# Patient Record
Sex: MALE | Race: OTHER | Employment: OTHER | URBAN - METROPOLITAN AREA
[De-identification: names, ages, dates, MRNs, and addresses within clinical notes are randomized per-mention and may not be internally consistent; named-entity substitution may affect disease eponyms.]

---

## 2017-07-25 RX ORDER — INSULIN LISPRO 100 [IU]/ML
INJECTION, SOLUTION INTRAVENOUS; SUBCUTANEOUS
COMMUNITY

## 2017-07-25 RX ORDER — LISINOPRIL 40 MG/1
40 TABLET ORAL DAILY
COMMUNITY

## 2017-07-25 RX ORDER — HYDROCODONE BITARTRATE AND ACETAMINOPHEN 7.5; 325 MG/1; MG/1
1 TABLET ORAL 3 TIMES DAILY
COMMUNITY
End: 2022-07-14

## 2017-07-25 RX ORDER — OFLOXACIN 3 MG/ML
2 SOLUTION/ DROPS OPHTHALMIC 3 TIMES DAILY
COMMUNITY
End: 2017-12-21

## 2017-07-25 RX ORDER — GABAPENTIN 300 MG/1
1200 CAPSULE ORAL 3 TIMES DAILY
COMMUNITY

## 2017-07-25 RX ORDER — ROSUVASTATIN CALCIUM 10 MG/1
10 TABLET, COATED ORAL DAILY
COMMUNITY
End: 2017-12-21

## 2017-07-25 RX ORDER — INSULIN ASPART 100 [IU]/ML
55 INJECTION, SUSPENSION SUBCUTANEOUS 2 TIMES DAILY
COMMUNITY
End: 2017-12-21

## 2017-07-25 RX ORDER — PREDNISONE 5 MG/1
TABLET ORAL SEE ADMIN INSTRUCTIONS
COMMUNITY
End: 2017-12-21

## 2017-07-25 RX ORDER — AMLODIPINE BESYLATE 10 MG/1
10 TABLET ORAL DAILY
COMMUNITY

## 2017-07-26 ENCOUNTER — ANESTHESIA EVENT (OUTPATIENT)
Dept: ENDOSCOPY | Age: 67
End: 2017-07-26
Payer: COMMERCIAL

## 2017-07-26 ENCOUNTER — ANESTHESIA (OUTPATIENT)
Dept: ENDOSCOPY | Age: 67
End: 2017-07-26
Payer: COMMERCIAL

## 2017-07-26 ENCOUNTER — HOSPITAL ENCOUNTER (OUTPATIENT)
Age: 67
Setting detail: OUTPATIENT SURGERY
Discharge: HOME | End: 2017-07-26
Attending: INTERNAL MEDICINE | Admitting: INTERNAL MEDICINE
Payer: COMMERCIAL

## 2017-07-26 VITALS
HEIGHT: 69 IN | RESPIRATION RATE: 19 BRPM | TEMPERATURE: 97.9 F | BODY MASS INDEX: 31.4 KG/M2 | SYSTOLIC BLOOD PRESSURE: 145 MMHG | OXYGEN SATURATION: 96 % | WEIGHT: 212 LBS | HEART RATE: 66 BPM | DIASTOLIC BLOOD PRESSURE: 85 MMHG

## 2017-07-26 LAB
GLUCOSE BLD STRIP.AUTO-MCNC: 186 MG/DL (ref 65–100)
SERVICE CMNT-IMP: ABNORMAL

## 2017-07-26 PROCEDURE — 74011250636 HC RX REV CODE- 250/636

## 2017-07-26 PROCEDURE — 88305 TISSUE EXAM BY PATHOLOGIST: CPT | Performed by: INTERNAL MEDICINE

## 2017-07-26 PROCEDURE — 74011250636 HC RX REV CODE- 250/636: Performed by: INTERNAL MEDICINE

## 2017-07-26 PROCEDURE — 82962 GLUCOSE BLOOD TEST: CPT

## 2017-07-26 PROCEDURE — 77030027957 HC TBNG IRR ENDOGTR BUSS -B: Performed by: INTERNAL MEDICINE

## 2017-07-26 PROCEDURE — 74011250637 HC RX REV CODE- 250/637: Performed by: INTERNAL MEDICINE

## 2017-07-26 PROCEDURE — 76040000007: Performed by: INTERNAL MEDICINE

## 2017-07-26 PROCEDURE — 77030029384 HC SNR POLYP CAPTVR II BSC -B: Performed by: INTERNAL MEDICINE

## 2017-07-26 PROCEDURE — 76060000032 HC ANESTHESIA 0.5 TO 1 HR: Performed by: INTERNAL MEDICINE

## 2017-07-26 PROCEDURE — 77030011640 HC PAD GRND REM COVD -A: Performed by: INTERNAL MEDICINE

## 2017-07-26 PROCEDURE — 77030003657 HC NDL SCLER BSC -B: Performed by: INTERNAL MEDICINE

## 2017-07-26 RX ORDER — EPINEPHRINE 0.1 MG/ML
1 INJECTION INTRACARDIAC; INTRAVENOUS
Status: DISCONTINUED | OUTPATIENT
Start: 2017-07-26 | End: 2017-07-26 | Stop reason: HOSPADM

## 2017-07-26 RX ORDER — ATROPINE SULFATE 0.1 MG/ML
0.5 INJECTION INTRAVENOUS
Status: DISCONTINUED | OUTPATIENT
Start: 2017-07-26 | End: 2017-07-26 | Stop reason: HOSPADM

## 2017-07-26 RX ORDER — SODIUM CHLORIDE 0.9 % (FLUSH) 0.9 %
5-10 SYRINGE (ML) INJECTION EVERY 8 HOURS
Status: DISCONTINUED | OUTPATIENT
Start: 2017-07-26 | End: 2017-07-26 | Stop reason: HOSPADM

## 2017-07-26 RX ORDER — DEXTROMETHORPHAN/PSEUDOEPHED 2.5-7.5/.8
1.2 DROPS ORAL
Status: DISCONTINUED | OUTPATIENT
Start: 2017-07-26 | End: 2017-07-26 | Stop reason: HOSPADM

## 2017-07-26 RX ORDER — FENTANYL CITRATE 50 UG/ML
100 INJECTION, SOLUTION INTRAMUSCULAR; INTRAVENOUS
Status: DISCONTINUED | OUTPATIENT
Start: 2017-07-26 | End: 2017-07-26 | Stop reason: HOSPADM

## 2017-07-26 RX ORDER — FLUMAZENIL 0.1 MG/ML
0.2 INJECTION INTRAVENOUS
Status: DISCONTINUED | OUTPATIENT
Start: 2017-07-26 | End: 2017-07-26 | Stop reason: HOSPADM

## 2017-07-26 RX ORDER — PROPOFOL 10 MG/ML
INJECTION, EMULSION INTRAVENOUS AS NEEDED
Status: DISCONTINUED | OUTPATIENT
Start: 2017-07-26 | End: 2017-07-26 | Stop reason: HOSPADM

## 2017-07-26 RX ORDER — SODIUM CHLORIDE 9 MG/ML
50 INJECTION, SOLUTION INTRAVENOUS CONTINUOUS
Status: DISCONTINUED | OUTPATIENT
Start: 2017-07-26 | End: 2017-07-26 | Stop reason: HOSPADM

## 2017-07-26 RX ORDER — MIDAZOLAM HYDROCHLORIDE 1 MG/ML
.25-1 INJECTION, SOLUTION INTRAMUSCULAR; INTRAVENOUS
Status: DISCONTINUED | OUTPATIENT
Start: 2017-07-26 | End: 2017-07-26 | Stop reason: HOSPADM

## 2017-07-26 RX ORDER — NALOXONE HYDROCHLORIDE 0.4 MG/ML
0.4 INJECTION, SOLUTION INTRAMUSCULAR; INTRAVENOUS; SUBCUTANEOUS
Status: DISCONTINUED | OUTPATIENT
Start: 2017-07-26 | End: 2017-07-26 | Stop reason: HOSPADM

## 2017-07-26 RX ORDER — SODIUM CHLORIDE 0.9 % (FLUSH) 0.9 %
5-10 SYRINGE (ML) INJECTION AS NEEDED
Status: DISCONTINUED | OUTPATIENT
Start: 2017-07-26 | End: 2017-07-26 | Stop reason: HOSPADM

## 2017-07-26 RX ORDER — SODIUM CHLORIDE 9 MG/ML
INJECTION, SOLUTION INTRAVENOUS
Status: DISCONTINUED | OUTPATIENT
Start: 2017-07-26 | End: 2017-07-26 | Stop reason: HOSPADM

## 2017-07-26 RX ADMIN — PROPOFOL 50 MG: 10 INJECTION, EMULSION INTRAVENOUS at 10:51

## 2017-07-26 RX ADMIN — PROPOFOL 50 MG: 10 INJECTION, EMULSION INTRAVENOUS at 10:41

## 2017-07-26 RX ADMIN — PROPOFOL 20 MG: 10 INJECTION, EMULSION INTRAVENOUS at 10:38

## 2017-07-26 RX ADMIN — PROPOFOL 50 MG: 10 INJECTION, EMULSION INTRAVENOUS at 10:48

## 2017-07-26 RX ADMIN — SODIUM CHLORIDE: 9 INJECTION, SOLUTION INTRAVENOUS at 10:07

## 2017-07-26 RX ADMIN — PROPOFOL 50 MG: 10 INJECTION, EMULSION INTRAVENOUS at 10:34

## 2017-07-26 RX ADMIN — SIMETHICONE 80 MG: 20 SUSPENSION/ DROPS ORAL at 10:31

## 2017-07-26 RX ADMIN — PROPOFOL 30 MG: 10 INJECTION, EMULSION INTRAVENOUS at 10:30

## 2017-07-26 RX ADMIN — PROPOFOL 30 MG: 10 INJECTION, EMULSION INTRAVENOUS at 10:27

## 2017-07-26 RX ADMIN — PROPOFOL 20 MG: 10 INJECTION, EMULSION INTRAVENOUS at 10:25

## 2017-07-26 RX ADMIN — SODIUM CHLORIDE 50 ML/HR: 900 INJECTION, SOLUTION INTRAVENOUS at 10:12

## 2017-07-26 RX ADMIN — PROPOFOL 50 MG: 10 INJECTION, EMULSION INTRAVENOUS at 10:21

## 2017-07-26 RX ADMIN — PROPOFOL 50 MG: 10 INJECTION, EMULSION INTRAVENOUS at 10:45

## 2017-07-26 NOTE — ROUTINE PROCESS
Saundra Baker  1950  [id number]    Situation:  Verbal report received from: Jocelin Akers RN  Procedure: Procedure(s):  COLONOSCOPY  ENDOSCOPIC MUCOSAL RESECTION  ENDOSCOPIC POLYPECTOMY  INJECTION    Background:    Preoperative diagnosis: 2CM ADENOMA IN LEFT COLON  Postoperative diagnosis: 1.- Surgical Anastamosis  2.- Diverticulosis  3.- Transverse Colon Tatoos x 2   4.- Transverse Colon Peacemeal Polypectomy  5.- Inadequate Preparation    :  Dr. Petros Morales  Assistant(s): Endoscopy Technician-1: Carmen Zuniga  Endoscopy RN-1: Ellie Dee RN    Specimens:   ID Type Source Tests Collected by Time Destination   1 : Transverse Colon Polyp Ventura Turner MD 7/26/2017 1039 Pathology     H. Pylori  no    Assessment:  Intra-procedure medications   Anesthesia gave intra-procedure sedation and medications, see anesthesia flow sheet yes    Intravenous fluids: NS@ KVO     Vital signs stable     Abdominal assessment: round and soft     Recommendation:  Discharge patient per MD order.   Family   Permission to share finding with family or friend yes

## 2017-07-26 NOTE — H&P
[address] [facility] [facility] [address]  217 [facility] 140 [facility], [address]  [phone]                                History and Physical     NAME: Ke Hsu   :  1950   MRN:  [id number]     HPI:  The patient was seen and examined. Date of last colonoscopy: 4 months, Polyps  Yes   Large polyp not removed at that time    Past Surgical History:   Procedure Laterality Date    HX CHOLECYSTECTOMY      HX GI      6 inches colon removed - d/t blockage    HX GI      colonoscopy - 2017 - polyps    HX ORTHOPAEDIC      surgery for left wrist fx after it healed d/t pt not being able to move it - has hardware     Past Medical History:   Diagnosis Date    Arthritis     Chronic obstructive pulmonary disease (HCC)     Chronic pain     neck/back/legs/knees especially right    Diabetes ([address]     type 1    Hypertension     Ill-defined condition     increased cholesterol    Ill-defined condition     peripheral neuropathy    Ill-defined condition     DDD in back and neck - sciatic pain - hx endoscopic injections in back     Social History   Substance Use Topics    Smoking status: Current Every Day Smoker    Smokeless tobacco: Never Used    Alcohol use Yes      Comment: 2-3 drinks per night     No Known Allergies  Family History   Problem Relation Age of Onset    Heart Disease Mother     Other Mother      DDD/arthritis    Stroke Mother      x3    Heart Disease Father     Other Father      arthritis/colon resection d/t \"being clogged\"/kidney stone    Cancer Paternal Uncle      lung     Current Facility-Administered Medications   Medication Dose Route Frequency    0.9% sodium chloride infusion  50 mL/hr IntraVENous CONTINUOUS    sodium chloride (NS) flush 5-10 mL  5-10 mL IntraVENous Q8H    sodium chloride (NS) flush 5-10 mL  5-10 mL IntraVENous PRN    midazolam (VERSED) injection 0.25-10 mg  0.25-10 mg IntraVENous Multiple    fentaNYL citrate (PF) injection 100 mcg  100 mcg IntraVENous MULTIPLE DOSE GIVEN    naloxone (NARCAN) injection 0.4 mg  0.4 mg IntraVENous Multiple    flumazenil (ROMAZICON) 0.1 mg/mL injection 0.2 mg  0.2 mg IntraVENous Multiple    simethicone (MYLICON) 53WK/6.5NF oral drops 80 mg  1.2 mL Oral Multiple    atropine injection 0.5 mg  0.5 mg IntraVENous ONCE PRN    EPINEPHrine (ADRENALIN) 0.1 mg/mL syringe 1 mg  1 mg Endoscopically ONCE PRN     Facility-Administered Medications Ordered in Other Encounters   Medication Dose Route Frequency    0.9% sodium chloride infusion   IntraVENous CONTINUOUS         PHYSICAL EXAM:  General: WD, WN. Alert, cooperative, no acute distress    HEENT: NC, Atraumatic. PERRLA, EOMI. Anicteric sclerae. Lungs:  CTA Bilaterally. No Wheezing/Rhonchi/Rales. Heart:  Regular  rhythm,  No murmur, No Rubs, No Gallops  Abdomen: Soft, Non distended, Non tender.  +Bowel sounds, no HSM  Extremities: No c/c/e  Neurologic:  CN 2-12 gi, Alert and oriented X 3. No acute neurological distress   Psych:   Good insight. Not anxious nor agitated. The heart, lungs and mental status were satisfactory for the administration of MAC sedation and for the procedure.       Mallampati score: 3       Assessment:   · Large colon polyp for removal    Plan:   · Endoscopic procedure  · MAC sedation   ·   ·

## 2017-07-26 NOTE — IP AVS SNAPSHOT
[address] 
[phone] Patient: Lucila Mathur MRN: [id number] DQB:5/0/8397 You are allergic to the following No active allergies Recent Documentation Height Weight BMI Smoking Status 1.753 m 96.2 kg 31.31 kg/m2 Current Every Day Smoker Emergency Contacts Name Discharge Info Relation Home Work Mobile Sharlene Gutierrez DISCHARGE CAREGIVER [3] Spouse [3] [phone] About your hospitalization You were admitted on:  July 26, 2017 You last received care in the:  [facility] ENDOSCOPY You were discharged on:  July 26, 2017 Unit phone number:  [phone] Why you were hospitalized Your primary diagnosis was:  Not on File Providers Seen During Your Hospitalizations Provider Role Specialty Primary office phone Geraldine Melo MD Attending Provider Gastroenterology [phone] Your Primary Care Physician (PCP) Primary Care Physician Office Phone Office Fax Joy Eber [phone] Follow-up Information None Current Discharge Medication List  
  
CONTINUE these medications which have NOT CHANGED Dose & Instructions Dispensing Information Comments Morning Noon Evening Bedtime  
 amLODIPine 10 mg tablet Commonly known as:  Remonia Grates Your last dose was: Your next dose is:    
   
   
 Dose:  10 mg Take 10 mg by mouth daily. Amlodipine besylate Refills:  0  
     
   
   
   
  
 CRESTOR 10 mg tablet Generic drug:  rosuvastatin Your last dose was: Your next dose is:    
   
   
 Dose:  10 mg Take 10 mg by mouth daily. Refills:  0  
     
   
   
   
  
 gabapentin 300 mg capsule Commonly known as:  NEURONTIN Your last dose was: Your next dose is:    
   
   
 Dose:  900 mg Take 900 mg by mouth three (3) times daily. Refills:  0 HumaLOG KwikPen 100 unit/mL kwikpen Generic drug:  insulin lispro Your last dose was: Your next dose is:    
   
   
 by SubCUTAneous route. Sliding scale in am as needed for elevated BS or if takes depends on diet he will eat. Refills:  0 HYDROcodone-acetaminophen 7.5-325 mg per tablet Commonly known as:  Zach Dhillon Your last dose was: Your next dose is:    
   
   
 Dose:  1 Tab Take 1 Tab by mouth three (3) times daily. Refills:  0  
     
   
   
   
  
 lisinopril 40 mg tablet Commonly known as:  Jose Ramachandran Your last dose was: Your next dose is:    
   
   
 Dose:  40 mg Take 40 mg by mouth daily. Refills:  0 NovoLOG Mix 70-30 FlexPen 100 unit/mL (70-30) Inpn Generic drug:  insulin aspart protamine/insulin aspart Your last dose was: Your next dose is:    
   
   
 Dose:  55 Units 55 Units by SubCUTAneous route two (2) times a day. Refills:  0  
     
   
   
   
  
 ofloxacin 0.3 % ophthalmic solution Commonly known as:  FLOXIN Your last dose was: Your next dose is:    
   
   
 Dose:  2 Drop Administer 2 Drops to left eye three (3) times daily. Refills:  0 POTASSIUM CHLORIDE PO Your last dose was: Your next dose is:    
   
   
 Dose:  10 mEq Take 10 mEq by mouth daily. ER Refills:  0  
     
   
   
   
  
 predniSONE 5 mg dose pack Commonly known as:  STERAPRED Your last dose was: Your next dose is: Take  by mouth See Admin Instructions. See administration instruction per 5mg dose pack. 7/25/2017 - 10 mg; 7/26/2017 - 5 mg; 7/272017 - 5 mg then will be finished (when medication was entered this was the doses left). Refills:  0 PROAIR HFA IN Your last dose was: Your next dose is:    
   
   
 Dose:  2 Puff Take 2 Puffs by inhalation as needed. Refills:  0 Discharge Instructions [address] 
[address] [place], [address] 
[phone] Elda Gracia [id number] 
1950 COLON DISCHARGE INSTRUCTIONS DISCOMFORT: 
Redness at IV site- apply warm compress to area; if redness or soreness persist- contact your physician There may be a slight amount of blood passed from the rectum Gaseous discomfort- walking, belching will help relieve any discomfort You may not operate a vehicle for 12 hours You may not  engage in an occupation involving machinery or appliances for rest of today You may not  drink alcoholic beverages for at least 12 hours Avoid making any critical decisions for at least 24 hour DIET: 
 High fiber diet.  however -  remember your colon is empty and a heavy meal will produce gas. Avoid these foods:  vegetables, fried / greasy foods, carbonated drinks for today ACTIVITY: It is recommended that you spend the remainder of the day resting -  avoid any strenuous activity. CALL M.D. ANY SIGN OF: Increasing pain, nausea, vomiting Abdominal distension (swelling) New increased bleeding (oral or rectal) Fever (chills) Pain in chest area Bloody discharge from nose or mouth Shortness of breath You may not  take any Advil, Aspirin, Ibuprofen, Motrin, Aleve, or Goodys for 7 days, ONLY  Tylenol as needed for pain. Post procedure diagnosis:  1.- Surgical Anastamosis 2.- Diverticulosis 3.- Transverse Colon Tatoos x 2  
4.- Transverse Colon Peacemeal Polypectomy 5.- Inadequate Preparation Follow-up Instructions: 
 
Call Dr. Yasmeen Greene for any questions or problems. If we took a biopsy please call the office within 2 weeks to discuss your                             pathology results. Telephone # [phone] Discharge Orders None Introducing [facility]! Angela Mckinnon introduces [employer] patient portal. Now you can access parts of your medical record, email your doctor's office, and request medication refills online. 1. In your internet browser, go to [email or web address] 2. Click on the First Time User? Click Here link in the Sign In box. You will see the New Member Sign Up page. 3. Enter your [employer] Access Code exactly as it appears below. You will not need to use this code after youve completed the sign-up process. If you do not sign up before the expiration date, you must request a new code. · [employer] Access Code: [email or web address] Expires: 10/24/2017 11:08 AM 
 
4. Enter the last four digits of your Social Security Number (xxxx) and Date of Birth (mm/dd/yyyy) as indicated and click Submit. You will be taken to the next sign-up page. 5. Create a [employer] ID. This will be your [employer] login ID and cannot be changed, so think of one that is secure and easy to remember. 6. Create a [employer] password. You can change your password at any time. 7. Enter your Password Reset Question and Answer. This can be used at a later time if you forget your password. 8. Enter your e-mail address. You will receive e-mail notification when new information is available in [address] 9. Click Sign Up. You can now view and download portions of your medical record. 10. Click the Download Summary menu link to download a portable copy of your medical information. If you have questions, please visit the Frequently Asked Questions section of the [employer] website. Remember, [employer] is NOT to be used for urgent needs. For medical emergencies, dial 911. Now available from your iPhone and Android! General Information Please provide this summary of care documentation to your next provider. Patient Signature:  ____________________________________________________________ Date:  ____________________________________________________________  
  
Arna Charlie Provider Signature:  ____________________________________________________________ Date:  ____________________________________________________________

## 2017-07-26 NOTE — PROGRESS NOTES

## 2017-07-26 NOTE — ANESTHESIA PREPROCEDURE EVALUATION
Anesthetic History   No history of anesthetic complications            Review of Systems / Medical History  Patient summary reviewed, nursing notes reviewed and pertinent labs reviewed    Pulmonary  Within defined limits  COPD      Smoker         Neuro/Psych   Within defined limits           Cardiovascular  Within defined limits  Hypertension                   GI/Hepatic/Renal  Within defined limits              Endo/Other  Within defined limits  Diabetes: using insulin         Other Findings              Physical Exam    Airway  Mallampati: II  TM Distance: > 6 cm  Neck ROM: normal range of motion   Mouth opening: Normal     Cardiovascular  Regular rate and rhythm,  S1 and S2 normal,  no murmur, click, rub, or gallop             Dental    Dentition: Full upper dentures     Pulmonary  Breath sounds clear to auscultation               Abdominal  GI exam deferred       Other Findings            Anesthetic Plan    ASA: 3  Anesthesia type: MAC          Induction: Intravenous  Anesthetic plan and risks discussed with: Patient

## 2017-07-26 NOTE — ANESTHESIA POSTPROCEDURE EVALUATION
Post-Anesthesia Evaluation and Assessment    Patient: Shar Patterson MRN: [id number]  SSN: [id number]    YOB: 1950  Age: 74 y.o. Sex: male       Cardiovascular Function/Vital Signs  Visit Vitals    BP (!) 88/40    Pulse 73    Temp 36.9 °C (98.4 °F)    Resp 16    Ht 5' 9\" (1.753 m)    Wt 96.2 kg (212 lb)    SpO2 95%    BMI 31.31 kg/m2       Patient is status post MAC anesthesia for Procedure(s):  COLONOSCOPY  ENDOSCOPIC MUCOSAL RESECTION  ENDOSCOPIC POLYPECTOMY  INJECTION. Nausea/Vomiting: None    Postoperative hydration reviewed and adequate. Pain:  Pain Scale 1: Numeric (0 - 10) (07/26/17 0945)  Pain Intensity 1: 0 (07/26/17 0945)   Managed    Neurological Status: At baseline    Mental Status and Level of Consciousness: Arousable    Pulmonary Status:   O2 Device: Nasal cannula (07/26/17 1058)   Adequate oxygenation and airway patent    Complications related to anesthesia: None    Post-anesthesia assessment completed.  No concerns    Signed By: Deborah Barron MD     July 26, 2017

## 2017-07-26 NOTE — DISCHARGE INSTRUCTIONS
[address] [facility]  [address] [facility] [address], [address] [facility]                                  Ashlie   [id number]  1950    COLON DISCHARGE INSTRUCTIONS    DISCOMFORT:  Redness at IV site- apply warm compress to area; if redness or soreness persist- contact your physician  There may be a slight amount of blood passed from the rectum  Gaseous discomfort- walking, belching will help relieve any discomfort  You may not operate a vehicle for 12 hours  You may not  engage in an occupation involving machinery or appliances for rest of today  You may not  drink alcoholic beverages for at least 12 hours  Avoid making any critical decisions for at least 24 hour    DIET:   High fiber diet. - however -  remember your colon is empty and a heavy meal will produce gas. Avoid these foods:  vegetables, fried / greasy foods, carbonated drinks for today     ACTIVITY:  It is recommended that you spend the remainder of the day resting -  avoid any strenuous activity. CALL M.D. ANY SIGN OF:   Increasing pain, nausea, vomiting  Abdominal distension (swelling)  New increased bleeding (oral or rectal)  Fever (chills)  Pain in chest area  Bloody discharge from nose or mouth  Shortness of breath    You may not  take any Advil, Aspirin, Ibuprofen, Motrin, Aleve, or Goodys for 7 days, ONLY  Tylenol as needed for pain. Post procedure diagnosis:  1.- Surgical Anastamosis  2.- Diverticulosis  3.- Transverse Colon Tatoos x 2   4.- Transverse Colon Peacemeal Polypectomy  5.- Inadequate Preparation    Follow-up Instructions:    Call Dr. Callahan Person for any questions or problems. If we took a biopsy please call the office within 2 weeks to discuss your                             pathology results.  Telephone # [phone]

## 2017-07-26 NOTE — PROCEDURES
[address] [facility]  [address] [facility], [address]  [phone]                              Colonoscopy Procedure Note      Indications:    Personal history of colon polyps (screening only)     :  Meredith Mesa MD    Referring Provider: Jen Camara MD    Sedation:  MAC anesthesia    Procedure Details:  After informed consent was obtained with all risks and benefits of procedure explained and preoperative exam completed, the patient was taken to the endoscopy suite and placed in the left lateral decubitus position. Upon sequential sedation as per above, a digital rectal exam was performed  And was normal.  The Olympus videocolonoscope  was inserted in the rectum and carefully advanced to the cecum, which was identified by the ileocecal valve and appendiceal orifice. The quality of preparation was inadequate. The colonoscope was slowly withdrawn with careful evaluation between folds. Retroflexion in the rectum was performed and was normal..     Findings:   Rectum: small internal hemorrhoids were seen. Sigmoid:     - Diverticulosis  Endo to side colo-colonic anastomosis was seen in the distal sigmoid colon. Suture material was present. Anastomosis was healthy  Descending Colon: no mucosal lesion appreciated  Transverse Colon: One sessile polyp about 40 mm X 18 mm was seen in the distal transverse colon. This was not bleeding and occupied about 1/3 of the circumference over one mucosal fold. Two areas of tattoo were seen, one proximal and one distal to this lesion. Ascending Colon: no mucosal lesion appreciated  Cecum: no mucosal lesion appreciated  Terminal Ileum: not intubated    Interventions:  injection of 12 cc of Eleview upon retroflexion as well as direct view was successful in lifting the lesion well. 1 piece meal polypectomy were performed using hot snare in retroflexed as well as direct views.  The polyp fragments were  retrieved    Specimen Removed:    ID Type Source Tests Collected by Time Destination   1 : Transverse Colon Polyp Preservative   Joseph Ojeda MD 7/26/2017 1039 Pathology       Complications: None. EBL:  None. Recommendations:   -Await pathology.  -High fiber diet.  -Colonoscopy in 6 months  -NO aspirin/NSAID's for 7 days     Discharge Disposition:  Home in the company of a  when able to ambulate.     Joseph Ojeda MD  7/26/2017  11:00 AM

## 2017-12-21 RX ORDER — INSULIN LISPRO 100 [IU]/ML
55 INJECTION, SUSPENSION SUBCUTANEOUS 2 TIMES DAILY
COMMUNITY

## 2017-12-21 RX ORDER — DICLOFENAC SODIUM 50 MG/1
50 TABLET, DELAYED RELEASE ORAL 2 TIMES DAILY
COMMUNITY

## 2017-12-21 RX ORDER — OMEPRAZOLE 20 MG/1
20 CAPSULE, DELAYED RELEASE ORAL DAILY
COMMUNITY

## 2017-12-21 RX ORDER — ROSUVASTATIN CALCIUM 10 MG/1
20 TABLET, COATED ORAL DAILY
COMMUNITY

## 2017-12-21 RX ORDER — DULOXETIN HYDROCHLORIDE 30 MG/1
90 CAPSULE, DELAYED RELEASE ORAL DAILY
COMMUNITY

## 2017-12-22 ENCOUNTER — ANESTHESIA EVENT (OUTPATIENT)
Dept: ENDOSCOPY | Age: 67
End: 2017-12-22
Payer: COMMERCIAL

## 2017-12-22 ENCOUNTER — ANESTHESIA (OUTPATIENT)
Dept: ENDOSCOPY | Age: 67
End: 2017-12-22
Payer: COMMERCIAL

## 2017-12-22 ENCOUNTER — HOSPITAL ENCOUNTER (OUTPATIENT)
Age: 67
Setting detail: OUTPATIENT SURGERY
Discharge: HOME | End: 2017-12-22
Attending: INTERNAL MEDICINE | Admitting: INTERNAL MEDICINE
Payer: COMMERCIAL

## 2017-12-22 VITALS
RESPIRATION RATE: 22 BRPM | HEART RATE: 70 BPM | TEMPERATURE: 98.1 F | SYSTOLIC BLOOD PRESSURE: 139 MMHG | BODY MASS INDEX: 29.83 KG/M2 | WEIGHT: 202 LBS | OXYGEN SATURATION: 93 % | DIASTOLIC BLOOD PRESSURE: 58 MMHG

## 2017-12-22 LAB
GLUCOSE BLD STRIP.AUTO-MCNC: 282 MG/DL (ref 65–100)
SERVICE CMNT-IMP: ABNORMAL

## 2017-12-22 PROCEDURE — 76040000019: Performed by: INTERNAL MEDICINE

## 2017-12-22 PROCEDURE — 74011000250 HC RX REV CODE- 250

## 2017-12-22 PROCEDURE — 82962 GLUCOSE BLOOD TEST: CPT

## 2017-12-22 PROCEDURE — 74011250636 HC RX REV CODE- 250/636: Performed by: INTERNAL MEDICINE

## 2017-12-22 PROCEDURE — 74011250636 HC RX REV CODE- 250/636

## 2017-12-22 PROCEDURE — 76060000031 HC ANESTHESIA FIRST 0.5 HR: Performed by: INTERNAL MEDICINE

## 2017-12-22 PROCEDURE — 77030027957 HC TBNG IRR ENDOGTR BUSS -B: Performed by: INTERNAL MEDICINE

## 2017-12-22 RX ORDER — EPINEPHRINE 0.1 MG/ML
1 INJECTION INTRACARDIAC; INTRAVENOUS
Status: DISCONTINUED | OUTPATIENT
Start: 2017-12-22 | End: 2017-12-22 | Stop reason: HOSPADM

## 2017-12-22 RX ORDER — SODIUM CHLORIDE 9 MG/ML
50 INJECTION, SOLUTION INTRAVENOUS CONTINUOUS
Status: DISCONTINUED | OUTPATIENT
Start: 2017-12-22 | End: 2017-12-22 | Stop reason: HOSPADM

## 2017-12-22 RX ORDER — FENTANYL CITRATE 50 UG/ML
100 INJECTION, SOLUTION INTRAMUSCULAR; INTRAVENOUS
Status: DISCONTINUED | OUTPATIENT
Start: 2017-12-22 | End: 2017-12-22 | Stop reason: HOSPADM

## 2017-12-22 RX ORDER — DEXTROMETHORPHAN/PSEUDOEPHED 2.5-7.5/.8
1.2 DROPS ORAL
Status: DISCONTINUED | OUTPATIENT
Start: 2017-12-22 | End: 2017-12-22 | Stop reason: HOSPADM

## 2017-12-22 RX ORDER — FLUMAZENIL 0.1 MG/ML
0.2 INJECTION INTRAVENOUS
Status: DISCONTINUED | OUTPATIENT
Start: 2017-12-22 | End: 2017-12-22 | Stop reason: HOSPADM

## 2017-12-22 RX ORDER — MIDAZOLAM HYDROCHLORIDE 1 MG/ML
.25-1 INJECTION, SOLUTION INTRAMUSCULAR; INTRAVENOUS
Status: DISCONTINUED | OUTPATIENT
Start: 2017-12-22 | End: 2017-12-22 | Stop reason: HOSPADM

## 2017-12-22 RX ORDER — SODIUM CHLORIDE 0.9 % (FLUSH) 0.9 %
5-10 SYRINGE (ML) INJECTION EVERY 8 HOURS
Status: DISCONTINUED | OUTPATIENT
Start: 2017-12-22 | End: 2017-12-22 | Stop reason: HOSPADM

## 2017-12-22 RX ORDER — LIDOCAINE HYDROCHLORIDE 20 MG/ML
INJECTION, SOLUTION EPIDURAL; INFILTRATION; INTRACAUDAL; PERINEURAL AS NEEDED
Status: DISCONTINUED | OUTPATIENT
Start: 2017-12-22 | End: 2017-12-22 | Stop reason: HOSPADM

## 2017-12-22 RX ORDER — SODIUM CHLORIDE 9 MG/ML
INJECTION, SOLUTION INTRAVENOUS
Status: DISCONTINUED | OUTPATIENT
Start: 2017-12-22 | End: 2017-12-22 | Stop reason: HOSPADM

## 2017-12-22 RX ORDER — ATROPINE SULFATE 0.1 MG/ML
0.5 INJECTION INTRAVENOUS
Status: DISCONTINUED | OUTPATIENT
Start: 2017-12-22 | End: 2017-12-22 | Stop reason: HOSPADM

## 2017-12-22 RX ORDER — NALOXONE HYDROCHLORIDE 0.4 MG/ML
0.4 INJECTION, SOLUTION INTRAMUSCULAR; INTRAVENOUS; SUBCUTANEOUS
Status: DISCONTINUED | OUTPATIENT
Start: 2017-12-22 | End: 2017-12-22 | Stop reason: HOSPADM

## 2017-12-22 RX ORDER — SODIUM CHLORIDE 0.9 % (FLUSH) 0.9 %
5-10 SYRINGE (ML) INJECTION AS NEEDED
Status: DISCONTINUED | OUTPATIENT
Start: 2017-12-22 | End: 2017-12-22 | Stop reason: HOSPADM

## 2017-12-22 RX ORDER — PROPOFOL 10 MG/ML
INJECTION, EMULSION INTRAVENOUS AS NEEDED
Status: DISCONTINUED | OUTPATIENT
Start: 2017-12-22 | End: 2017-12-22 | Stop reason: HOSPADM

## 2017-12-22 RX ADMIN — PROPOFOL 50 MG: 10 INJECTION, EMULSION INTRAVENOUS at 12:48

## 2017-12-22 RX ADMIN — PROPOFOL 50 MG: 10 INJECTION, EMULSION INTRAVENOUS at 12:44

## 2017-12-22 RX ADMIN — PROPOFOL 50 MG: 10 INJECTION, EMULSION INTRAVENOUS at 12:51

## 2017-12-22 RX ADMIN — LIDOCAINE HYDROCHLORIDE 40 MG: 20 INJECTION, SOLUTION EPIDURAL; INFILTRATION; INTRACAUDAL; PERINEURAL at 12:44

## 2017-12-22 RX ADMIN — PROPOFOL 50 MG: 10 INJECTION, EMULSION INTRAVENOUS at 12:43

## 2017-12-22 RX ADMIN — SODIUM CHLORIDE: 9 INJECTION, SOLUTION INTRAVENOUS at 12:38

## 2017-12-22 RX ADMIN — PROPOFOL 50 MG: 10 INJECTION, EMULSION INTRAVENOUS at 12:46

## 2017-12-22 NOTE — PROCEDURES
[address] [facility].  217 [facility] [address], [address]  [phone]                              Colonoscopy Procedure Note      Indications:    Large colon polyp-s/p removal     :  Corinne Smith MD    Referring Provider: Mani Dupont MD    Sedation:  MAC anesthesia    Procedure Details:  After informed consent was obtained with all risks and benefits of procedure explained and preoperative exam completed, the patient was taken to the endoscopy suite and placed in the left lateral decubitus position. Upon sequential sedation as per above, a digital rectal exam was performed  And was normal.  The Olympus videocolonoscope  was inserted in the rectum and carefully advanced to the cecum, which was identified by the ileocecal valve and appendiceal orifice. The quality of preparation was fair. The colonoscope was slowly withdrawn with careful evaluation between folds. Retroflexion in the rectum was performed and was normal..     Findings:   Rectum: small internal hemorrhoids were seen. Sigmoid:     - Diverticulosis  Endo to side colo-colonic anastomosis was seen in the distal sigmoid colon. Suture material was present. Anastomosis was healthy  Descending Colon: no mucosal lesion appreciated  Transverse Colon: Two areas of tattoo were seen. The area of previously resected polyp was clean and without any residual polyp. Ascending Colon: no mucosal lesion appreciated  Cecum: no mucosal lesion appreciated  Terminal Ileum: not intubated    Interventions:  none    Specimen Removed:  * No specimens in log *    Complications: None. EBL:  None. Recommendations:   -High fiber diet. -Repeat colonoscopy in 1 year     Resume normal medication(s). Discharge Disposition:  Home in the company of a  when able to ambulate.     Corinne Smith MD  12/22/2017  12:56 PM

## 2017-12-22 NOTE — ANESTHESIA PREPROCEDURE EVALUATION
Anesthetic History   No history of anesthetic complications            Review of Systems / Medical History  Patient summary reviewed, nursing notes reviewed and pertinent labs reviewed    Pulmonary    COPD               Neuro/Psych   Within defined limits           Cardiovascular    Hypertension              Exercise tolerance: >4 METS     GI/Hepatic/Renal               Comments: polyps Endo/Other    Diabetes: using insulin  Hypothyroidism  Arthritis     Other Findings            Physical Exam    Airway  Mallampati: II  TM Distance: > 6 cm  Neck ROM: normal range of motion   Mouth opening: Normal     Cardiovascular    Rhythm: regular  Rate: normal         Dental  No notable dental hx       Pulmonary  Breath sounds clear to auscultation               Abdominal         Other Findings            Anesthetic Plan    ASA: 3  Anesthesia type: MAC          Induction: Intravenous  Anesthetic plan and risks discussed with: Patient

## 2017-12-22 NOTE — ROUTINE PROCESS
Brittney Barrientos  1950  [id number]    Situation:  Verbal report received from: Ramon Jean RN  Procedure: Procedure(s):  COLONOSCOPY    Background:    Preoperative diagnosis: PRECANCEROUS POYLPS  Postoperative diagnosis: Previous polyp site, tatooed, clear; diverticulosis, hemorrhoids    :  Dr. Rafia Moore  Assistant(s): Endoscopy Technician-1: Pushpa Ramos  Endoscopy RN-1: Mounika Murdock RN    Specimens: * No specimens in log *  H. Pylori  no    Assessment:  Intra-procedure medications       Anesthesia gave intra-procedure sedation and medications, see anesthesia flow sheet yes    Intravenous fluids: NS@ KVO     Vital signs stable     Abdominal assessment: round and soft     Recommendation:  Discharge patient per MD order  Family or Friend Pt wife  Permission to share finding with family or friend yes

## 2017-12-22 NOTE — PERIOP NOTES
Patient has been evaluated by anesthesia pre-procedure. Patient alert and oriented. Vital signs will not be charted by the Endoscopy nurse. All vitals, airway, and loc are monitored by anesthesia staff throughout procedure. .Endoscope was pre-cleaned at bedside immediately following procedure by VIOLET Patrick

## 2017-12-22 NOTE — ANESTHESIA POSTPROCEDURE EVALUATION
Post-Anesthesia Evaluation and Assessment    Patient: Awilda Bennett MRN: [id number]  SSN: [id number]    YOB: 1950  Age: 74 y.o. Sex: male       Cardiovascular Function/Vital Signs  Visit Vitals    /77    Pulse 79    Temp 36.7 °C (98.1 °F)    Resp 24    Wt 91.6 kg (202 lb)    SpO2 96%    BMI 29.83 kg/m2       Patient is status post MAC anesthesia for Procedure(s):  COLONOSCOPY. Nausea/Vomiting: None    Postoperative hydration reviewed and adequate. Pain:  Pain Scale 1: Numeric (0 - 10) (12/22/17 1303)  Pain Intensity 1: 0 (12/22/17 1303)   Managed    Neurological Status: At baseline    Mental Status and Level of Consciousness: Arousable    Pulmonary Status:   O2 Device: Room air (12/22/17 1303)   Adequate oxygenation and airway patent    Complications related to anesthesia: None    Post-anesthesia assessment completed.  No concerns    Signed By: Vern Johnson MD     December 22, 2017

## 2017-12-22 NOTE — DISCHARGE INSTRUCTIONS
Brianna Rae 272  217 [facility] [address], [address] [facility]                                  Russ Loomis  [id number]  1950    COLON DISCHARGE INSTRUCTIONS    DISCOMFORT:  Redness at IV site- apply warm compress to area; if redness or soreness persist- contact your physician  There may be a slight amount of blood passed from the rectum  Gaseous discomfort- walking, belching will help relieve any discomfort  You may not operate a vehicle for 12 hours  You may not  engage in an occupation involving machinery or appliances for rest of today  You may not  drink alcoholic beverages for at least 12 hours  Avoid making any critical decisions for at least 24 hour    DIET:   High fiber diet. - however -  remember your colon is empty and a heavy meal will produce gas. Avoid these foods:  vegetables, fried / greasy foods, carbonated drinks for today     ACTIVITY:  It is recommended that you spend the remainder of the day resting -  avoid any strenuous activity. CALL M.D. ANY SIGN OF:   Increasing pain, nausea, vomiting  Abdominal distension (swelling)  New increased bleeding (oral or rectal)  Fever (chills)  Pain in chest area  Bloody discharge from nose or mouth  Shortness of breath    You may not  take any Advil, Aspirin, Ibuprofen, Motrin, Aleve, or Goodys for 5 days, ONLY  Tylenol as needed for pain. Post procedure diagnosis:  Previous polyp site, tatooed, clear; diverticulosis, hemorrhoids    Follow-up Instructions:    Call Dr. Elizabeth Jones for any questions or problems. If we took a biopsy please call the office within 2 weeks to discuss your                             pathology results.  Telephone # [phone]

## 2017-12-22 NOTE — IP AVS SNAPSHOT
[address] 
[phone] Patient: Veronika Panchal MRN: [id number] LNB:[id number][birth year]8/[id number] About your hospitalization You were admitted on:  December 22, 2017 You last received care in the:  [facility] ENDOSCOPY You were discharged on:  December 22, 2017 Why you were hospitalized Your primary diagnosis was:  Not on File Discharge Orders None A check kam indicates which time of day the medication should be taken. My Medications TAKE these medications as instructed Instructions Each Dose to Equal  
 Morning Noon Evening Bedtime  
 amLODIPine 10 mg tablet Commonly known as:  Yee Spruce Your last dose was: Your next dose is: Take 10 mg by mouth daily. Amlodipine besylate 10 mg  
    
   
   
   
  
 BC HEADACHE POWDER PO Your last dose was: Your next dose is: Take  by mouth as needed. CRESTOR 10 mg tablet Generic drug:  rosuvastatin Your last dose was: Your next dose is: Take 20 mg by mouth daily. 20 mg  
    
   
   
   
  
 diclofenac EC 50 mg EC tablet Commonly known as:  VOLTAREN Your last dose was: Your next dose is: Take 50 mg by mouth two (2) times a day. 50 mg DULoxetine 30 mg capsule Commonly known as:  CYMBALTA Your last dose was: Your next dose is: Take 90 mg by mouth daily. 90 mg  
    
   
   
   
  
 gabapentin 300 mg capsule Commonly known as:  NEURONTIN Your last dose was: Your next dose is: Take 1,200 mg by mouth three (3) times daily. 1200 mg HumaLOG KwikPen 100 unit/mL kwikpen Generic drug:  insulin lispro Your last dose was: Your next dose is: by SubCUTAneous route. Sliding scale in am as needed for elevated BS or if takes depending on diet he will eat. HumaLOG Mix 75-25 KwikPen 100 unit/mL (75-25) Inpn Generic drug:  Insulin Lisp & Lisp Prot (Hum) Your last dose was: Your next dose is:    
   
   
 55 Units by SubCUTAneous route two (2) times a day. 55 Units HYDROcodone-acetaminophen 7.5-325 mg per tablet Commonly known as:  Fiorella Sanchez Your last dose was: Your next dose is: Take 1 Tab by mouth three (3) times daily. 1 Tab  
    
   
   
   
  
 lisinopril 40 mg tablet Commonly known as:  Fadumo Sykese Your last dose was: Your next dose is: Take 40 mg by mouth daily. 40 mg  
    
   
   
   
  
 omeprazole 20 mg capsule Commonly known as:  PRILOSEC Your last dose was: Your next dose is: Take 20 mg by mouth daily. 20 mg POTASSIUM CHLORIDE PO Your last dose was: Your next dose is: Take 10 mEq by mouth daily. ER  
 10 mEq PROAIR HFA IN Your last dose was: Your next dose is: Take 2 Puffs by inhalation as needed. 90mcg 2 Puff Discharge Instructions [address] 
217 [facility] [address] [facility], [address] 
[phone] Rosey Chand [id number] 
1950 COLON DISCHARGE INSTRUCTIONS DISCOMFORT: 
Redness at IV site- apply warm compress to area; if redness or soreness persist- contact your physician There may be a slight amount of blood passed from the rectum Gaseous discomfort- walking, belching will help relieve any discomfort You may not operate a vehicle for 12 hours You may not  engage in an occupation involving machinery or appliances for rest of today You may not  drink alcoholic beverages for at least 12 hours Avoid making any critical decisions for at least 24 hour DIET: 
 High fiber diet.  however -  remember your colon is empty and a heavy meal will produce gas. Avoid these foods:  vegetables, fried / greasy foods, carbonated drinks for today ACTIVITY: It is recommended that you spend the remainder of the day resting -  avoid any strenuous activity. CALL M.D. ANY SIGN OF: Increasing pain, nausea, vomiting Abdominal distension (swelling) New increased bleeding (oral or rectal) Fever (chills) Pain in chest area Bloody discharge from nose or mouth Shortness of breath You may not  take any Advil, Aspirin, Ibuprofen, Motrin, Aleve, or Goodys for 5 days, ONLY  Tylenol as needed for pain. Post procedure diagnosis:  Previous polyp site, tatooed, clear; diverticulosis, hemorrhoids Follow-up Instructions: 
 
Call Dr. Marlene Quarles for any questions or problems. If we took a biopsy please call the office within 2 weeks to discuss your                             pathology results. Telephone # [phone] Introducing [facility] & HEALTH SERVICES! Stefanie Nichols introduces [facility] [facility] [employer] patient portal. Now you can access parts of your medical record, email your doctor's office, and request medication refills online. 1. In your internet browser, go to [email or web address] 2. Click on the First Time User? Click Here link in the Sign In box. You will see the New Member Sign Up page. 3. Enter your [facility] [facility] [employer] Access Code exactly as it appears below. You will not need to use this code after youve completed the sign-up process. If you do not sign up before the expiration date, you must request a new code. · [facility] [facility] [employer] Access Code: [email or web address] Expires: 3/22/2018  1:33 PM 
 
4.  Enter the last four digits of your Social Security Number (xxxx) and Date of Birth (mm/dd/yyyy) as indicated and click Submit. You will be taken to the next sign-up page. 5. Create a CS-Keys ID. This will be your [employer] login ID and cannot be changed, so think of one that is secure and easy to remember. 6. Create a CS-[employer] password. You can change your password at any time. 7. Enter your Password Reset Question and Answer. This can be used at a later time if you forget your password. 8. Enter your e-mail address. You will receive e-mail notification when new information is available in [address] 9. Click Sign Up. You can now view and download portions of your medical record. 10. Click the Download Summary menu link to download a portable copy of your medical information. If you have questions, please visit the Frequently Asked Questions section of the CS-[employer] website. Remember, [employer] is NOT to be used for urgent needs. For medical emergencies, dial 911. Now available from your iPhone and Android! Providers Seen During Your Hospitalization Provider Specialty Primary office phone Flower Lim MD Gastroenterology [phone] Your Primary Care Physician (PCP) Primary Care Physician Office Phone Office Fax Tray Kaiser [phone] You are allergic to the following No active allergies Recent Documentation Weight BMI Smoking Status 91.6 kg 29.83 kg/m2 Current Every Day Smoker Emergency Contacts Name Discharge Info Relation Home Work Mobile Sucristian Khushboo DISCHARGE CAREGIVER [3] Spouse [3] [phone] Patient Belongings The following personal items are in your possession at time of discharge: 
                             
 
  
  
 Please provide this summary of care documentation to your next provider. Signatures-by signing, you are acknowledging that this After Visit Summary has been reviewed with you and you have received a copy. Patient Signature:  ____________________________________________________________ Date:  ____________________________________________________________  
  
Joelene Batman Provider Signature:  ____________________________________________________________ Date:  ____________________________________________________________

## 2017-12-22 NOTE — H&P
118 [facility].  217 [facility] 140 Chris Schaffer, [address]  [phone]                                History and Physical     NAME: Leopold Goring   :  1950   MRN:  [id number]     HPI:  The patient was seen and examined.     Past Surgical History:   Procedure Laterality Date    COLONOSCOPY N/A 2017    COLONOS[facility] by [facility]id Johnston [facility]    HX CHOLECYSTECTOMY      HX GI      6 inches colon removed - d/t blockage    HX GI      colonoscopy - 2017 - polyps    HX ORTHOPAEDIC      surgery for left wrist fx after it healed d/t pt not being able to move it - has hardware     Past Medical History:   Diagnosis Date    Adverse effect of anesthesia     kept pt in ICU longer after colon surgery d/t smoking    Arthritis     psoriatic    Chronic obstructive pulmonary disease (HCC)     Chronic pain     neck/back/legs/knees especially right/ankles/right hand/fingers    Diabetes ([facility] [address]     type 1    Hypertension     Ill-defined condition     increased cholesterol    Ill-defined condition     peripheral neuropathy    Ill-defined condition     DDD in back and neck - sciatic pain - hx endoscopic injections in back    Ill-defined condition     slight rotator cuff tears on both shoulders/injection in left shoulder    Rheumatic fever     Thyroid disease     \"a little bit high\"/improving when subsequent blood tests - monitoring     Social History   Substance Use Topics    Smoking status: Current Every Day Smoker    Smokeless tobacco: Never Used    Alcohol use No      Comment: none in 3 months now     No Known Allergies  Family History   Problem Relation Age of Onset    Heart Disease Mother     Other Mother      DDD/arthritis    Stroke Mother      x3    Heart Disease Father     Other Father      arthritis/colon resection d/t \"being clogged\"/kidney stone    Cancer Paternal Uncle      lung    Other Maternal Aunt      shingles    Heart Disease Maternal Aunt     Heart Disease Maternal Uncle     Heart Disease Maternal Uncle      Current Facility-Administered Medications   Medication Dose Route Frequency    0.9% sodium chloride infusion  50 mL/hr IntraVENous CONTINUOUS    sodium chloride (NS) flush 5-10 mL  5-10 mL IntraVENous Q8H    sodium chloride (NS) flush 5-10 mL  5-10 mL IntraVENous PRN    midazolam (VERSED) injection 0.25-10 mg  0.25-10 mg IntraVENous Multiple    fentaNYL citrate (PF) injection 100 mcg  100 mcg IntraVENous MULTIPLE DOSE GIVEN    naloxone (NARCAN) injection 0.4 mg  0.4 mg IntraVENous Multiple    flumazenil (ROMAZICON) 0.1 mg/mL injection 0.2 mg  0.2 mg IntraVENous Multiple    simethicone (MYLICON) 26GU/2.3RH oral drops 80 mg  1.2 mL Oral Multiple    atropine injection 0.5 mg  0.5 mg IntraVENous ONCE PRN    EPINEPHrine (ADRENALIN) 0.1 mg/mL syringe 1 mg  1 mg Endoscopically ONCE PRN         PHYSICAL EXAM:  General: WD, WN. Alert, cooperative, no acute distress    HEENT: NC, Atraumatic. PERRLA, EOMI. Anicteric sclerae. Lungs:  CTA Bilaterally. No Wheezing/Rhonchi/Rales. Heart:  Regular  rhythm,  No murmur, No Rubs, No Gallops  Abdomen: Soft, Non distended, Non tender.  +Bowel sounds, no HSM  Extremities: No c/c/e  Neurologic:  CN 2-12 gi, Alert and oriented X 3. No acute neurological distress   Psych:   Good insight. Not anxious nor agitated. The heart, lungs and mental status were satisfactory for the administration of MAC sedation and for the procedure.       Mallampati score: 3       Assessment:   · Large colon polyp-s/p piecemeal resection    Plan:   · Endoscopic procedure  · MAC sedation   ·

## 2021-07-02 ENCOUNTER — TRANSCRIBE ORDER (OUTPATIENT)
Dept: SCHEDULING | Age: 71
End: 2021-07-02

## 2021-07-02 DIAGNOSIS — I83.90 ASYMPTOMATIC VARICOSE VEINS: ICD-10-CM

## 2021-07-02 DIAGNOSIS — R60.0 EDEMA, LEG: Primary | ICD-10-CM

## 2021-07-02 DIAGNOSIS — F17.290 CIGAR SMOKER: ICD-10-CM

## 2021-07-09 ENCOUNTER — HOSPITAL ENCOUNTER (OUTPATIENT)
Dept: VASCULAR SURGERY | Age: 71
Discharge: HOME | End: 2021-07-09
Attending: PHYSICIAN ASSISTANT
Payer: COMMERCIAL

## 2021-07-09 DIAGNOSIS — F17.290 CIGAR SMOKER: ICD-10-CM

## 2021-07-09 DIAGNOSIS — R60.0 EDEMA, LEG: ICD-10-CM

## 2021-07-09 DIAGNOSIS — I83.90 ASYMPTOMATIC VARICOSE VEINS: ICD-10-CM

## 2021-07-09 PROCEDURE — 93971 EXTREMITY STUDY: CPT

## 2022-05-18 ENCOUNTER — TRANSCRIBE ORDER (OUTPATIENT)
Dept: SCHEDULING | Age: 72
End: 2022-05-18

## 2022-05-18 DIAGNOSIS — Z72.0 TOBACCO ABUSE: Primary | ICD-10-CM

## 2022-05-18 DIAGNOSIS — I10 HTN (HYPERTENSION): ICD-10-CM

## 2022-05-26 ENCOUNTER — TRANSCRIBE ORDER (OUTPATIENT)
Dept: SCHEDULING | Age: 72
End: 2022-05-26

## 2022-05-26 DIAGNOSIS — F17.290 NICOTINE DEPENDENCE, OTHER TOBACCO PRODUCT, UNCOMPLICATED: Primary | ICD-10-CM

## 2022-06-04 ENCOUNTER — HOSPITAL ENCOUNTER (OUTPATIENT)
Dept: CT IMAGING | Age: 72
Discharge: HOME | End: 2022-06-04
Attending: PHYSICIAN ASSISTANT
Payer: COMMERCIAL

## 2022-06-04 ENCOUNTER — HOSPITAL ENCOUNTER (OUTPATIENT)
Dept: ULTRASOUND IMAGING | Age: 72
Discharge: HOME | End: 2022-06-04
Attending: PHYSICIAN ASSISTANT
Payer: COMMERCIAL

## 2022-06-04 DIAGNOSIS — F17.290 NICOTINE DEPENDENCE, OTHER TOBACCO PRODUCT, UNCOMPLICATED: ICD-10-CM

## 2022-06-04 DIAGNOSIS — I10 HTN (HYPERTENSION): ICD-10-CM

## 2022-06-04 PROCEDURE — 76706 US ABDL AORTA SCREEN AAA: CPT

## 2022-06-04 PROCEDURE — 71271 CT THORAX LUNG CANCER SCR C-: CPT

## 2022-06-07 ENCOUNTER — TRANSCRIBE ORDER (OUTPATIENT)
Dept: SCHEDULING | Age: 72
End: 2022-06-07

## 2022-06-07 DIAGNOSIS — E04.9 ENLARGEMENT OF THYROID: Primary | ICD-10-CM

## 2022-07-06 ENCOUNTER — OFFICE VISIT (OUTPATIENT)
Dept: SLEEP MEDICINE | Age: 72
End: 2022-07-06
Payer: COMMERCIAL

## 2022-07-06 ENCOUNTER — DOCUMENTATION ONLY (OUTPATIENT)
Dept: SLEEP MEDICINE | Age: 72
End: 2022-07-06

## 2022-07-06 VITALS
OXYGEN SATURATION: 90 % | BODY MASS INDEX: 30.17 KG/M2 | SYSTOLIC BLOOD PRESSURE: 148 MMHG | HEIGHT: 69 IN | HEART RATE: 82 BPM | DIASTOLIC BLOOD PRESSURE: 94 MMHG | WEIGHT: 203.7 LBS | RESPIRATION RATE: 20 BRPM

## 2022-07-06 DIAGNOSIS — G47.33 OSA (OBSTRUCTIVE SLEEP APNEA): Primary | ICD-10-CM

## 2022-07-06 PROCEDURE — 99204 OFFICE O/P NEW MOD 45 MIN: CPT | Performed by: SPECIALIST

## 2022-07-06 PROCEDURE — 1123F ACP DISCUSS/DSCN MKR DOCD: CPT | Performed by: SPECIALIST

## 2022-07-06 RX ORDER — MELATONIN
DAILY
COMMUNITY

## 2022-07-06 NOTE — PROGRESS NOTES
[address] [place], [address]  Tel.  [phone]  Fax. 100 [facility] 60  [place], [address] [facility]  Tel.  [phone]  Fax. [phone] [address] [facility] [place] [address]  Tel.  [phone]  Fax. [phone]       Chief Complaint       Chief Complaint   Patient presents with    New Patient       HPI      Lee Sender is 81 y.o. male seen for evaluation of a sleep disorder. He has a history of snoring. Normally retires between  11:30-1 AM and will get out of bed between 8: 30-9: 30.  May awaken every 2-3 hours. Attributes much of the awakening to arthritic discomfort. Denies vivid dreaming and nightmares, sleep talking or sleepwalking, bruxism or nocturnal incontinence, abnormal arm or leg movements, hypnagogue hallucinations, sleep paralysis or cataplexy. Is tired after lunch or when watching TV. Was evaluated with a [employer] home sleep test.  Study demonstrated sleep disordered breathing characterized by an AHI of 16/h associated with minimal SaO2 of 80%. [place] Sleepiness Score: (P) 5       No Known Allergies    Current Outpatient Medications   Medication Sig Dispense Refill    ascorbic acid, vitamin C, (ascorbic acid) 100 mg tab Take  by mouth. cholecalciferol (Vitamin D3) (1000 Units /25 mcg) tablet Take  by mouth daily. mv-mn/folic/lutein/herbal 909 (ALIVE MEN'S 50 PLUS MULTIVIT PO) Take  by mouth. L gasseri/B bifidum/B longum (Locish PO) Take  by mouth. rosuvastatin (CRESTOR) 10 mg tablet Take 20 mg by mouth daily. omeprazole (PRILOSEC) 20 mg capsule Take 20 mg by mouth daily. DULoxetine (CYMBALTA) 30 mg capsule Take 90 mg by mouth daily. diclofenac EC (VOLTAREN) 50 mg EC tablet Take 50 mg by mouth two (2) times a day. ASPIRIN/SALICYLAMIDE/CAFFEINE (BC HEADACHE POWDER PO) Take  by mouth as needed.       insulin lispro (HUMALOG KWIKPEN) 100 unit/mL kwikpen by SubCUTAneous route. Sliding scale in am as needed for elevated BS or if takes depending on diet he will eat. HYDROcodone-acetaminophen (NORCO) 7.5-325 mg per tablet Take 1 Tab by mouth three (3) times daily. lisinopril (PRINIVIL, ZESTRIL) 40 mg tablet Take 40 mg by mouth daily. amLODIPine (NORVASC) 10 mg tablet Take 10 mg by mouth daily. Amlodipine besylate      POTASSIUM CHLORIDE PO Take 10 mEq by mouth daily. ER      gabapentin (NEURONTIN) 300 mg capsule Take 1,200 mg by mouth three (3) times daily. ALBUTEROL SULFATE (PROAIR HFA IN) Take 2 Puffs by inhalation as needed. 90mcg      Insulin Lisp & Lisp Prot, Hum, (HUMALOG MIX 75-25 KWIKPEN) 100 unit/mL (75-25) inpn 55 Units by SubCUTAneous route two (2) times a day. He  has a past medical history of Adverse effect of anesthesia, Arthritis, Chronic obstructive pulmonary disease (HCC), Chronic pain, Diabetes ([facility] [address] Hypertension, Ill-defined condition, Ill-defined condition, Ill-defined condition, Ill-defined condition, Rheumatic fever, and Thyroid disease. He has no past medical history of Sleep apnea. He  has a past surgical history that includes hx orthopaedic; hx cholecystectomy; colonoscopy (N/A, 7/26/2017); hx gi (2012); hx gi; and colonoscopy (N/A, 12/22/2017). He family history includes Cancer in his paternal uncle; Heart Disease in his father, maternal aunt, maternal uncle, maternal uncle, and mother; Other in his father, maternal aunt, and mother; Stroke in his mother. He  reports that he has been smoking. He has never used smokeless tobacco. He reports that he does not drink alcohol. Review of Systems:  ROS      Objective:     Visit Vitals  BP (!) 148/94   Pulse 82   Resp 20   Ht 5' 9\" (1.753 m)   Wt 203 lb 11.2 oz (92.4 kg)   SpO2 90%   BMI 30.08 kg/m²     Body mass index is 30.08 kg/m².     General:   Conversant, cooperative   Eyes:  Pupils equal and reactive, no nystagmus   Oropharynx:   Mallampati score II, tongue normal, uvula prominent       Neck:   No carotid bruits; Chest/Lungs:  Clear on auscultation    CVS:  Normal rate, regular rhythm   Skin:  Warm to touch; no obvious rashes   Neuro:  Speech fluent, face symmetrical, tongue movement normal   Psych:  Normal affect,  normal countenance        Assessment:       ICD-10-CM ICD-9-CM    1. JOZEF (obstructive sleep apnea)  G47.33 327.23 AMB SUPPLY ORDER     Sleep-disordered breathing associated with arterial desaturation. APAP 6 to 16 cm will be started. Compliance visit will be scheduled. Plan:     Orders Placed This Encounter    AMB SUPPLY ORDER     Diagnosis: Obstructive Sleep Apnea ICD-10 Code (G47.33)     Positive Airway Pressure Therapy: Duration of need: 99 months. ResMed APAP Device or patient preference: Minimum Pressure: 6 cmH2O, Maximum Pressure: 16 cmH2O. CPAP mask -  Patient preference, headgear, heated tubing, and filter;  heated humidifier. Wireless modem. Remote monitoring enrollment.  Oral/Nasal Combo Mask 1 every 3 months.  Oral Cushion Combo Mask (Replace) 2 per month.  Nasal Pillows Combo Mask (Replace) 2 per month.  Full Face Mask 1 every 3 months.  Full Face Mask Cushion 1 per month.  Nasal Cushion (Replace) 2 per month.  Nasal Pillows (Replace) 2 per month.  Nasal Interface Mask 1 every 3 months.  Headgear 1 every 6 months.  Chinstrap 1 every 6 months.  Tubing 1 every 3 months.  Filter(s) Disposable 2 per month.  Filter(s) Non-Disposable 1 every 6 months.  Oral Interface 1 every 3 months. [facility] for [place] (Replace) 1 every 6 months.  Tubing with heating element 1 every 3 months. Sarbjit Rojas MD, FAASM  Diplomate, American Board of Sleep Medicine  NPI [id number]  Electronically signed 7/6/22       * Patient has a history and examination consistent with the diagnosis of sleep apnea.   * Sleep testing was reviewed  * He was provided information on sleep apnea including corresponding risk factors and the importance of proper treatment. * Treatment options if indicated were reviewed today. Instructions:  Do not engage in activities requiring a normal degree of alertness if fatigue is present. The patient understands that untreated or undertreated sleep apnea could impair judgement and the ability to function normally during the day. Call or return if symptoms worsen or persist.          Sukhjinder Jules MD, [facility]  Electronically signed 07/06/22       This note was created using voice recognition software. Despite editing, there may be syntax errors. This note will not be viewable in [address]

## 2022-07-06 NOTE — PROGRESS NOTES
Chief Complaint   Patient presents with   [facility]       Visit Vitals  BP (!) 148/94   Pulse 82   Resp 20   Ht 5' 9\" (1.753 m)   Wt 203 lb 11.2 oz (92.4 kg)   SpO2 90%   BMI 30.08 kg/m²

## 2022-07-06 NOTE — PATIENT INSTRUCTIONS
Sleep Apnea: Care Instructions  Overview     Sleep apnea means that you frequently stop breathing for 10 seconds or longer during sleep. It can be mild to severe, based on the number of times an hour that you stop breathing. Blocked or narrowed airways in your nose, mouth, or throat can cause sleep apnea. Your airway can become blocked when your throat muscles and tongue relax during sleep. You can help treat sleep apnea at home by making lifestyle changes. You also can use a CPAP breathing machine that keeps tissues in the throat from blocking your airway. Or your doctor may suggest that you use a breathing device while you sleep. It helps keep your airway open. This could be a device that you put in your mouth. In some cases, surgery may be needed to remove enlarged tissues in the throat. Follow-up care is a key part of your treatment and safety. Be sure to make and go to all appointments, and call your doctor if you are having problems. It's also a good idea to know your test results and keep a list of the medicines you take. How can you care for yourself at home? · Lose weight, if needed. · Sleep on your side. It may help mild apnea. · Avoid alcohol and medicines such as sleeping pills, opioids, or sedatives before bed. · Don't smoke. If you need help quitting, talk to your doctor. · Prop up the head of your bed. · Treat breathing problems, such as a stuffy nose, that are caused by a cold or allergies. · Try a continuous positive airway pressure (CPAP) breathing machine if your doctor recommends it. · If CPAP doesn't work for you, ask your doctor if you can try other masks, settings, or breathing machines. · Try oral breathing devices or other nasal devices. · Talk to your doctor if your nose feels dry or bleeds, or if it gets runny or stuffy when you use a breathing machine. · Tell your doctor if you're sleepy during the day and it affects your daily life.  Don't drive or operate machinery when you're drowsy. When should you call for help? Watch closely for changes in your health, and be sure to contact your doctor if:    · You still have sleep apnea even though you have made lifestyle changes.     · You are thinking of trying a device such as CPAP.     · You are having problems using a CPAP or similar machine.     · You are still sleepy during the day, and it affects your daily life. Where can you learn more? Go to http://www.gray.com/  Enter J936 in the search box to learn more about \"Sleep Apnea: Care Instructions. \"  Current as of: July 6, 2021               Content Version: 13.2  © 0561-5734 [employer]. Care instructions adapted under license by [employer] (which disclaims liability or warranty for this information). If you have questions about a medical condition or this instruction, always ask your healthcare professional. [place] [address] any warranty or liability for your use of this information.

## 2022-07-14 ENCOUNTER — HOSPITAL ENCOUNTER (EMERGENCY)
Age: 72
Discharge: HOME | End: 2022-07-14
Attending: EMERGENCY MEDICINE
Payer: COMMERCIAL

## 2022-07-14 ENCOUNTER — APPOINTMENT (OUTPATIENT)
Dept: GENERAL RADIOLOGY | Age: 72
End: 2022-07-14
Attending: EMERGENCY MEDICINE
Payer: COMMERCIAL

## 2022-07-14 VITALS
DIASTOLIC BLOOD PRESSURE: 77 MMHG | BODY MASS INDEX: 30.07 KG/M2 | TEMPERATURE: 97.5 F | WEIGHT: 203 LBS | HEART RATE: 73 BPM | OXYGEN SATURATION: 92 % | RESPIRATION RATE: 18 BRPM | SYSTOLIC BLOOD PRESSURE: 167 MMHG | HEIGHT: 69 IN

## 2022-07-14 DIAGNOSIS — S61.209A AVULSION OF FINGERTIP, INITIAL ENCOUNTER: Primary | ICD-10-CM

## 2022-07-14 PROCEDURE — 74011250636 HC RX REV CODE- 250/636: Performed by: EMERGENCY MEDICINE

## 2022-07-14 PROCEDURE — 75810000283 HC INJECTION NERVE BLOCK

## 2022-07-14 PROCEDURE — 90471 IMMUNIZATION ADMIN: CPT

## 2022-07-14 PROCEDURE — 73130 X-RAY EXAM OF HAND: CPT

## 2022-07-14 PROCEDURE — 99284 EMERGENCY DEPT VISIT MOD MDM: CPT

## 2022-07-14 PROCEDURE — 74011000250 HC RX REV CODE- 250: Performed by: EMERGENCY MEDICINE

## 2022-07-14 PROCEDURE — 90715 TDAP VACCINE 7 YRS/> IM: CPT | Performed by: EMERGENCY MEDICINE

## 2022-07-14 RX ORDER — HYDROCODONE BITARTRATE AND ACETAMINOPHEN 5; 325 MG/1; MG/1
1 TABLET ORAL
Qty: 6 TABLET | Refills: 0 | Status: SHIPPED | OUTPATIENT
Start: 2022-07-14 | End: 2022-07-16

## 2022-07-14 RX ORDER — MORPHINE SULFATE 4 MG/ML
4 INJECTION INTRAVENOUS ONCE
Status: COMPLETED | OUTPATIENT
Start: 2022-07-14 | End: 2022-07-14

## 2022-07-14 RX ORDER — CEPHALEXIN 500 MG/1
500 CAPSULE ORAL 2 TIMES DAILY
Qty: 14 CAPSULE | Refills: 0 | Status: SHIPPED | OUTPATIENT
Start: 2022-07-14 | End: 2022-07-21

## 2022-07-14 RX ORDER — LIDOCAINE HYDROCHLORIDE 10 MG/ML
5 INJECTION, SOLUTION EPIDURAL; INFILTRATION; INTRACAUDAL; PERINEURAL ONCE
Status: COMPLETED | OUTPATIENT
Start: 2022-07-14 | End: 2022-07-14

## 2022-07-14 RX ORDER — BACITRACIN 500 UNIT/G
1 PACKET (EA) TOPICAL
Status: COMPLETED | OUTPATIENT
Start: 2022-07-14 | End: 2022-07-14

## 2022-07-14 RX ADMIN — BACITRACIN 1 PACKET: 500 OINTMENT TOPICAL at 20:31

## 2022-07-14 RX ADMIN — LIDOCAINE HYDROCHLORIDE 5 ML: 10 INJECTION, SOLUTION EPIDURAL; INFILTRATION; INTRACAUDAL; PERINEURAL at 20:31

## 2022-07-14 RX ADMIN — MORPHINE SULFATE 4 MG: 4 INJECTION, SOLUTION INTRAMUSCULAR; INTRAVENOUS at 19:25

## 2022-07-14 RX ADMIN — TETANUS TOXOID, REDUCED DIPHTHERIA TOXOID AND ACELLULAR PERTUSSIS VACCINE, ADSORBED 0.5 ML: 5; 2.5; 8; 8; 2.5 SUSPENSION INTRAMUSCULAR at 19:25

## 2022-07-14 NOTE — ED TRIAGE NOTES
Pt arrives ambulatory to triage w/ c/c of laceration to L thumb on table saw. Bleeding controlled in triage. Unsure of last tetanus.

## 2022-07-15 NOTE — ED PROVIDER NOTES
21-year-old male presents with laceration to the left thumb on a circular saw just prior to arrival.  Bleeding controlled in triage. He is unsure when his last tetanus shot was. He denies any other injuries. Rates his pain 9 out of 10.            Past Medical History:   Diagnosis Date    Adverse effect of anesthesia     kept pt in ICU longer after colon surgery d/t smoking    Arthritis     psoriatic    Chronic obstructive pulmonary disease (HCC)     Chronic pain     neck/back/legs/knees especially right/ankles/right hand/fingers    Diabetes (HCC)     type 1    Hypertension     Ill-defined condition     increased cholesterol    Ill-defined condition     peripheral neuropathy    Ill-defined condition     DDD in back and neck - sciatic pain - hx endoscopic injections in back    Ill-defined condition     slight rotator cuff tears on both shoulders/injection in left shoulder    Rheumatic fever     Thyroid disease     \"a little bit high\"/improving when subsequent blood tests - monitoring       Past Surgical History:   Procedure Laterality Date    COLONOSCOPY N/A 7/26/2017    COLONOSCOPY performed by Neida Iverson MD at [facility] ENDOSCOPY    COLONOSCOPY N/A 12/22/2017    COLONOSCOPY performed by Neida Iverson MD at [facility] ENDOSCOPY    HX CHOLECYSTECTOMY      HX GI  2012    6 inches colon removed - d/t blockage    HX GI      colonoscopy - 5/2017 - polyps    HX ORTHOPAEDIC      surgery for left wrist fx after it healed d/t pt not being able to move it - has hardware         Family History:   Problem Relation Age of Onset    Heart Disease Mother     Other Mother         DDD/arthritis    Stroke Mother         x3    Heart Disease Father     Other Father         arthritis/colon resection d/t \"being clogged\"/kidney stone    Cancer Paternal Uncle         lung    Other Maternal Aunt         shingles    Heart Disease Maternal Aunt     Heart Disease Maternal Uncle     Heart Disease Maternal Uncle Social History     Socioeconomic History    Marital status:      Spouse name: Not on file    Number of children: Not on file    Years of education: Not on file    Highest education level: Not on file   Occupational History    Not on file   Tobacco Use    Smoking status: Current Every Day Smoker    Smokeless tobacco: Never Used   Substance and Sexual Activity    Alcohol use: No     Comment: none in 3 months now    Drug use: Not on file    Sexual activity: Not on file   Other Topics Concern    Not on file   Social History Narrative    Not on file     Social Determinants of Health     Financial Resource Strain:     Difficulty of Paying Living Expenses: Not on file   Food Insecurity:     Worried About Running Out of Food in the Last Year: Not on file    Lalit of Food in the Last Year: Not on file   Transportation Needs:     Lack of Transportation (Medical): Not on file    Lack of Transportation (Non-Medical): Not on file   Physical Activity:     Days of Exercise per Week: Not on file    Minutes of Exercise per Session: Not on file   Stress:     Feeling of Stress : Not on file   Social Connections:     Frequency of Communication with Friends and Family: Not on file    Frequency of Social Gatherings with Friends and Family: Not on file    Attends Ten[employer] Services: Not on file    Active Member of [address] [place] [address] [employer] or Organizations: Not on file    Attends Club or Organization Meetings: Not on file    Marital Status: Not on file   Intimate Partner Violence:     Fear of Current or Ex-Partner: Not on file    Emotionally Abused: Not on file    Physically Abused: Not on file    Sexually Abused: Not on file   Housing Stability:     Unable to Pay for Housing in the Last Year: Not on file    Number of [place] in the Last Year: Not on file    Unstable Housing in the Last Year: Not on file         ALLERGIES: Patient has no known allergies.     Review of Systems   All other systems reviewed and are negative. Vitals:    07/14/22 1723   BP: (!) 167/77   Pulse: 73   Resp: 18   Temp: 97.5 °F (36.4 °C)   SpO2: 92%   Weight: 92.1 kg (203 lb)   Height: 5' 9\" (1.753 m)            Physical Exam  Vitals and nursing note reviewed. Constitutional:       Appearance: He is well-developed. HENT:      Head: Normocephalic and atraumatic. Eyes:      General: No scleral icterus. Neck:      Trachea: No tracheal deviation. Cardiovascular:      Rate and Rhythm: Normal rate. Pulmonary:      Effort: Pulmonary effort is normal.   Abdominal:      General: There is no distension. Musculoskeletal:         General: No deformity. Cervical back: No rigidity. Comments: Left first digit with almost complete fingertip avulsion with possible exposed bone   Skin:     General: Skin is warm and dry. Neurological:      General: No focal deficit present. Mental Status: He is alert. Psychiatric:         Mood and Affect: Mood normal.          MDM  Number of Diagnoses or Management Options  Avulsion of fingertip, initial encounter  Diagnosis management comments: No fracture on x-ray although his avulsion is near his bone. Will cover with antibiotics. Wound irrigated in the emergency department after digital block. Dressing applied. Referred to hand surgery. Given return precautions. Lei Elkins.  Zoila Booker MD           Procedures

## 2022-08-04 ENCOUNTER — HOSPITAL ENCOUNTER (OUTPATIENT)
Dept: ULTRASOUND IMAGING | Age: 72
Discharge: HOME | End: 2022-08-04
Attending: PHYSICIAN ASSISTANT
Payer: COMMERCIAL

## 2022-08-04 DIAGNOSIS — E04.9 ENLARGEMENT OF THYROID: ICD-10-CM

## 2022-08-04 PROCEDURE — 76536 US EXAM OF HEAD AND NECK: CPT

## 2022-09-06 ENCOUNTER — OFFICE VISIT (OUTPATIENT)
Dept: SLEEP MEDICINE | Age: 72
End: 2022-09-06
Payer: COMMERCIAL

## 2022-09-06 VITALS
WEIGHT: 204.7 LBS | SYSTOLIC BLOOD PRESSURE: 136 MMHG | BODY MASS INDEX: 30.32 KG/M2 | HEIGHT: 69 IN | DIASTOLIC BLOOD PRESSURE: 69 MMHG | OXYGEN SATURATION: 96 % | HEART RATE: 62 BPM

## 2022-09-06 DIAGNOSIS — G47.33 OSA (OBSTRUCTIVE SLEEP APNEA): Primary | ICD-10-CM

## 2022-09-06 PROCEDURE — 1123F ACP DISCUSS/DSCN MKR DOCD: CPT | Performed by: SPECIALIST

## 2022-09-06 PROCEDURE — 99213 OFFICE O/P EST LOW 20 MIN: CPT | Performed by: SPECIALIST

## 2022-09-06 NOTE — PROGRESS NOTES
[address] [facility] [place] [place], [address]  Tel.  [phone]  Fax. 100 [facility] 60  [place], [address] [facility]  Tel.  [phone]  Fax. [phone] [address] [address] [address] [place]   Tel.  [phone]  Fax. [phone]         Chief Complaint       Chief Complaint   Patient presents with    Sleep Problem     1st adherence         HPI        Momo Saldivar is a 81 y.o. male seen for follow-up. He presented with a history of nocturnal awakening every 2-3 hours. Had been evaluated with a [employer] home sleep test.  Study demonstrated sleep disordered breathing characterized by an AHI of 16/h associated with minimal SaO2 of 80%. APAP 6-16 cm initiated. Compliance data downloaded and reviewed in detail with the patient today. During the past 30 days, APAP used during 30 days with the average daily use of 4.7 hours. CMS compliance criteria 60%. AHI 6.5 per hour. Mask leak variable. AHI elevated with higher mask leak parameters. No Known Allergies    Current Outpatient Medications   Medication Sig Dispense Refill    ascorbic acid, vitamin C, (VITAMIN C) 100 mg tab Take  by mouth. cholecalciferol (VITAMIN D3) (1000 Units /25 mcg) tablet Take  by mouth daily. mv-mn/folic/lutein/herbal 228 (ALIVE MEN'S 50 PLUS MULTIVIT PO) Take  by mouth. L gasseri/B bifidum/B longum ([employer] PO) Take  by mouth. rosuvastatin (CRESTOR) 10 mg tablet Take 20 mg by mouth daily. omeprazole (PRILOSEC) 20 mg capsule Take 20 mg by mouth daily. DULoxetine (CYMBALTA) 30 mg capsule Take 90 mg by mouth daily. diclofenac EC (VOLTAREN) 50 mg EC tablet Take 50 mg by mouth two (2) times a day. ASPIRIN/SALICYLAMIDE/CAFFEINE (BC HEADACHE POWDER PO) Take  by mouth as needed. insulin lispro (HUMALOG) 100 unit/mL kwikpen by SubCUTAneous route.  Sliding scale in am as needed for elevated BS or if takes depending on diet he will eat.      lisinopril (PRINIVIL, ZESTRIL) 40 mg tablet Take 40 mg by mouth daily. amLODIPine (NORVASC) 10 mg tablet Take 10 mg by mouth daily. Amlodipine besylate      POTASSIUM CHLORIDE PO Take 10 mEq by mouth daily. ER      gabapentin (NEURONTIN) 300 mg capsule Take 1,200 mg by mouth three (3) times daily. ALBUTEROL SULFATE (PROAIR HFA IN) Take 2 Puffs by inhalation as needed. 90mcg      Insulin Lisp & Lisp Prot, Hum, (HUMALOG MIX 75-25 KWIKPEN) 100 unit/mL (75-25) inpn 55 Units by SubCUTAneous route two (2) times a day. He  has a past medical history of Adverse effect of anesthesia, Arthritis, Chronic obstructive pulmonary disease (HCC), Chronic pain, Diabetes ([facility] [address] Hypertension, Ill-defined condition, Ill-defined condition, Ill-defined condition, Ill-defined condition, Rheumatic fever, and Thyroid disease. He has no past medical history of Sleep apnea. He  has a past surgical history that includes hx orthopaedic; hx cholecystectomy; colonoscopy (N/A, 7/26/2017); hx gi (2012); hx gi; and colonoscopy (N/A, 12/22/2017). He family history includes Cancer in his paternal uncle; Heart Disease in his father, maternal aunt, maternal uncle, maternal uncle, and mother; Other in his father, maternal aunt, and mother; Stroke in his mother. He  reports that he has been smoking. He has never used smokeless tobacco. He reports that he does not drink alcohol. Review of Systems:  Unchanged per patient      Objective:   Visit Vitals  /69 (BP 1 Location: Left upper arm, BP Patient Position: Sitting)   Pulse 62   Ht 5' 9\" (1.753 m)   Wt 204 lb 11.2 oz (92.9 kg)   SpO2 96%   BMI 30.23 kg/m²     Body mass index is 30.23 kg/m².      General:   Conversant, cooperative   Eyes:  Pupils equal and reactive, no nystagmus   Oropharynx:   Mallampati score II, tongue normal,  uvula prominent                CVS:  Normal rate, regular rhythm        Neuro:  Speech fluent, face symmetrical Assessment:       ICD-10-CM ICD-9-CM    1. JOZEF (obstructive sleep apnea)  G47.33 327.23           Sleep disordered breathing with increasing AHI reflecting higher leak parameters. Has impacted compliance. CPAP clinic. Compliance review in 6 weeks. PAP continues to benefit patient and remains necessary for control of his sleep apnea. Plan:   No orders of the defined types were placed in this encounter. *A copy of compliance data was provided to the patient and reviewed in detail. *CPAP will be  continued at the above pressure settings. The patient is to contact the office if there are problems with either mask or pressure settings. Follow-up will be scheduled at which time compliance data will be reviewed. * Patient has a history and examination consistent with the diagnosis of sleep apnea. * He was provided information on sleep apnea including corresponding risk factors and the importance of proper treatment. * Treatment options if indicated were reviewed today. Sarbjit Rojas MD, [facility]  Electronically signed 09/06/22        This note was created using voice recognition software. Despite editing, there may be syntax errors. This note will not be viewable in [address]

## 2022-09-13 ENCOUNTER — OFFICE VISIT (OUTPATIENT)
Dept: SLEEP MEDICINE | Age: 72
End: 2022-09-13

## 2022-09-13 ENCOUNTER — TELEPHONE (OUTPATIENT)
Dept: SLEEP MEDICINE | Age: 72
End: 2022-09-13

## 2022-09-13 DIAGNOSIS — G47.33 OSA (OBSTRUCTIVE SLEEP APNEA): Primary | ICD-10-CM

## 2022-09-13 NOTE — TELEPHONE ENCOUNTER
Compliance download obtained. Patient on APAP 6-16 cm. CPAP use during 29/30 days with CMS compliance 53%. Average use 4.45 hours. Average AHI 15/h. Mask leaks frequently elevated. Impression: Mask leak contributing to elevated AHI. Sleep technologist: Please advise patient of compliance results. Please schedule CPAP clinic to deal with leak issues. Follow-up visit in 6 weeks.

## 2022-09-19 ENCOUNTER — OFFICE VISIT (OUTPATIENT)
Dept: SLEEP MEDICINE | Age: 72
End: 2022-09-19

## 2022-09-19 DIAGNOSIS — G47.33 OSA (OBSTRUCTIVE SLEEP APNEA): Primary | ICD-10-CM

## 2022-10-18 ENCOUNTER — OFFICE VISIT (OUTPATIENT)
Dept: SLEEP MEDICINE | Age: 72
End: 2022-10-18
Payer: COMMERCIAL

## 2022-10-18 VITALS
BODY MASS INDEX: 29.77 KG/M2 | OXYGEN SATURATION: 95 % | WEIGHT: 201 LBS | HEIGHT: 69 IN | DIASTOLIC BLOOD PRESSURE: 80 MMHG | SYSTOLIC BLOOD PRESSURE: 149 MMHG | HEART RATE: 69 BPM

## 2022-10-18 DIAGNOSIS — G47.33 OSA (OBSTRUCTIVE SLEEP APNEA): Primary | ICD-10-CM

## 2022-10-18 PROCEDURE — 99213 OFFICE O/P EST LOW 20 MIN: CPT | Performed by: SPECIALIST

## 2022-10-18 PROCEDURE — 1123F ACP DISCUSS/DSCN MKR DOCD: CPT | Performed by: SPECIALIST

## 2022-10-18 NOTE — PROGRESS NOTES
[address] [facility] [address] [place], [address]  Tel.  [phone]  Fax. 100 [facility] 60  [place], [address] [facility]  Tel.  [phone]  Fax. [phone] [address] [facility] [address] [place]   Tel.  [phone]  Fax. [phone]         Chief Complaint       Chief Complaint   Patient presents with    Sleep Problem     6 week follow up/wants to discuss other trmnt options         HPI        Karen Prescott is a 81 y.o. male seen for follow-up. He presented with a history of nocturnal awakening every 2-3 hours. Had been evaluated with a [employer] home sleep test.  Study demonstrated sleep disordered breathing characterized by an AHI of 16/h associated with minimal SaO2 of 80%. APAP 6-16 cm initiated. Unusual compliance reviewed demonstrated CMS compliance of 60%. AHI 6.5/h. Variable mask leak. CPAP clinic. Compliance data downloaded and reviewed in detail with the patient today. During the past 30 days, APAP used during 29 days with the average daily use of 5.2 hours. CMS compliance criteria 70%. AHI 2.1 per hour. Mask leak variable, may be elevated. No Known Allergies    Current Outpatient Medications   Medication Sig Dispense Refill    ascorbic acid, vitamin C, (VITAMIN C) 100 mg tab Take  by mouth. cholecalciferol (VITAMIN D3) (1000 Units /25 mcg) tablet Take  by mouth daily. mv-mn/folic/lutein/herbal 958 (ALIVE MEN'S 50 PLUS MULTIVIT PO) Take  by mouth. L gasseri/B bifidum/B longum ([employer] PO) Take  by mouth. rosuvastatin (CRESTOR) 10 mg tablet Take 20 mg by mouth daily. omeprazole (PRILOSEC) 20 mg capsule Take 20 mg by mouth daily. DULoxetine (CYMBALTA) 30 mg capsule Take 90 mg by mouth daily. diclofenac EC (VOLTAREN) 50 mg EC tablet Take 50 mg by mouth two (2) times a day. ASPIRIN/SALICYLAMIDE/CAFFEINE (BC HEADACHE POWDER PO) Take  by mouth as needed.       insulin lispro (HUMALOG) 100 unit/mL kwikpen by SubCUTAneous route. Sliding scale in am as needed for elevated BS or if takes depending on diet he will eat. lisinopril (PRINIVIL, ZESTRIL) 40 mg tablet Take 40 mg by mouth daily. amLODIPine (NORVASC) 10 mg tablet Take 10 mg by mouth daily. Amlodipine besylate      POTASSIUM CHLORIDE PO Take 10 mEq by mouth daily. ER      gabapentin (NEURONTIN) 300 mg capsule Take 1,200 mg by mouth three (3) times daily. ALBUTEROL SULFATE (PROAIR HFA IN) Take 2 Puffs by inhalation as needed. 90mcg      Insulin Lisp & Lisp Prot, Hum, (HUMALOG MIX 75-25 KWIKPEN) 100 unit/mL (75-25) inpn 55 Units by SubCUTAneous route two (2) times a day. He  has a past medical history of Adverse effect of anesthesia, Arthritis, Chronic obstructive pulmonary disease (HCC), Chronic pain, Diabetes ([address] Hypertension, Ill-defined condition, Ill-defined condition, Ill-defined condition, Ill-defined condition, Rheumatic fever, and Thyroid disease. He has no past medical history of Sleep apnea. He  has a past surgical history that includes hx orthopaedic; hx cholecystectomy; colonoscopy (N/A, 7/26/2017); hx gi (2012); hx gi; and colonoscopy (N/A, 12/22/2017). He family history includes Cancer in his paternal uncle; Heart Disease in his father, maternal aunt, maternal uncle, maternal uncle, and mother; Other in his father, maternal aunt, and mother; Stroke in his mother. He  reports that he has been smoking. He has never used smokeless tobacco. He reports that he does not drink alcohol. Review of Systems:  Unchanged per patient      Objective:   Visit Vitals  BP (!) 149/80 (BP 1 Location: Right upper arm, BP Patient Position: Sitting)   Pulse 69   Ht 5' 9\" (1.753 m)   Wt 201 lb (91.2 kg)   SpO2 95%   BMI 29.68 kg/m²     Body mass index is 29.68 kg/m².      General:   Conversant, cooperative   Eyes:   no nystagmus   Oropharynx:   Mallampati score II, tongue normal, uvula prominent CVS:  Normal rate, regular rhythm        Neuro:  Speech fluent, face symmetrical             Assessment:       ICD-10-CM ICD-9-CM    1. JOZEF (obstructive sleep apnea)  G47.33 327.23            PAP continues to benefit patient and remains necessary for control of his sleep apnea. Ongoing mask issues. May benefit from a F&P Aram (L) . Will contact the office if mask issues persist.  Remote download in 2 weeks. Follow-up appointment in several months. Plan:   No orders of the defined types were placed in this encounter. *A copy of compliance data was provided to the patient and reviewed in detail. *CPAP will be  continued at the above pressure settings. The patient is to contact the office if there are problems with either mask or pressure settings. Follow-up will be scheduled at which time compliance data will be reviewed. * Patient has a history and examination consistent with the diagnosis of sleep apnea. * He was provided information on sleep apnea including corresponding risk factors and the importance of proper treatment. * Treatment options if indicated were reviewed today. Terese Montoya MD, [employer]  Electronically signed 10/18/22        This note was created using voice recognition software. Despite editing, there may be syntax errors. This note will not be viewable in [address]

## 2022-11-07 ENCOUNTER — TELEPHONE (OUTPATIENT)
Dept: SLEEP MEDICINE | Age: 72
End: 2022-11-07

## 2022-11-07 NOTE — TELEPHONE ENCOUNTER
Patient called and stated that he no longer wished to be under the care of the sleep center as he did not want to use pap therapy any longer. Patient did not want to discuss.

## 2023-09-20 ENCOUNTER — HOSPITAL ENCOUNTER (OUTPATIENT)
Facility: HOSPITAL | Age: 73
Setting detail: OUTPATIENT SURGERY
Discharge: HOME | End: 2023-09-20
Attending: SPECIALIST | Admitting: SPECIALIST
Payer: COMMERCIAL

## 2023-09-20 VITALS
BODY MASS INDEX: 29.37 KG/M2 | OXYGEN SATURATION: 92 % | DIASTOLIC BLOOD PRESSURE: 68 MMHG | HEART RATE: 55 BPM | TEMPERATURE: 98.5 F | WEIGHT: 193.8 LBS | HEIGHT: 68 IN | RESPIRATION RATE: 15 BRPM | SYSTOLIC BLOOD PRESSURE: 155 MMHG

## 2023-09-20 DIAGNOSIS — R94.39 ABNORMAL STRESS TEST: ICD-10-CM

## 2023-09-20 LAB
ECHO BSA: 2.05 M2
GLUCOSE BLD STRIP.AUTO-MCNC: 137 MG/DL (ref 65–117)
GLUCOSE BLD STRIP.AUTO-MCNC: 96 MG/DL (ref 65–117)
SERVICE CMNT-IMP: ABNORMAL
SERVICE CMNT-IMP: NORMAL

## 2023-09-20 PROCEDURE — C1894 INTRO/SHEATH, NON-LASER: HCPCS | Performed by: SPECIALIST

## 2023-09-20 PROCEDURE — 99152 MOD SED SAME PHYS/QHP 5/>YRS: CPT | Performed by: SPECIALIST

## 2023-09-20 PROCEDURE — 82962 GLUCOSE BLOOD TEST: CPT

## 2023-09-20 PROCEDURE — 2709999900 HC NON-CHARGEABLE SUPPLY: Performed by: SPECIALIST

## 2023-09-20 PROCEDURE — C1769 GUIDE WIRE: HCPCS | Performed by: SPECIALIST

## 2023-09-20 PROCEDURE — C1760 CLOSURE DEV, VASC: HCPCS | Performed by: SPECIALIST

## 2023-09-20 PROCEDURE — 6360000004 HC RX CONTRAST MEDICATION: Performed by: SPECIALIST

## 2023-09-20 PROCEDURE — 93458 L HRT ARTERY/VENTRICLE ANGIO: CPT | Performed by: SPECIALIST

## 2023-09-20 PROCEDURE — 6360000002 HC RX W HCPCS: Performed by: SPECIALIST

## 2023-09-20 RX ORDER — HYDROCODONE BITARTRATE AND ACETAMINOPHEN 10; 325 MG/1; MG/1
1 TABLET ORAL EVERY 4 HOURS PRN
COMMUNITY

## 2023-09-20 RX ORDER — MIDAZOLAM HYDROCHLORIDE 1 MG/ML
INJECTION INTRAMUSCULAR; INTRAVENOUS PRN
Status: DISCONTINUED | OUTPATIENT
Start: 2023-09-20 | End: 2023-09-20 | Stop reason: HOSPADM

## 2023-09-20 RX ORDER — LIDOCAINE 50 MG/G
1 PATCH TOPICAL DAILY
COMMUNITY

## 2023-09-20 RX ORDER — CLOBETASOL PROPIONATE 0.5 MG/ML
LOTION TOPICAL 2 TIMES DAILY
COMMUNITY

## 2023-09-20 RX ORDER — HEPARIN SODIUM 200 [USP'U]/100ML
INJECTION, SOLUTION INTRAVENOUS PRN
Status: DISCONTINUED | OUTPATIENT
Start: 2023-09-20 | End: 2023-09-20 | Stop reason: HOSPADM

## 2023-09-20 RX ORDER — ASPIRIN 81 MG/1
81 TABLET ORAL DAILY
COMMUNITY

## 2023-09-20 RX ORDER — ACETAMINOPHEN 325 MG/1
650 TABLET ORAL EVERY 4 HOURS PRN
Status: DISCONTINUED | OUTPATIENT
Start: 2023-09-20 | End: 2023-09-20 | Stop reason: HOSPADM

## 2023-09-20 RX ORDER — TADALAFIL 20 MG/1
20 TABLET ORAL PRN
COMMUNITY

## 2023-09-20 RX ORDER — SODIUM CHLORIDE 0.9 % (FLUSH) 0.9 %
5-40 SYRINGE (ML) INJECTION EVERY 12 HOURS SCHEDULED
Status: DISCONTINUED | OUTPATIENT
Start: 2023-09-20 | End: 2023-09-20 | Stop reason: HOSPADM

## 2023-09-20 RX ORDER — PHENOL 1.4 %
1 AEROSOL, SPRAY (ML) MUCOUS MEMBRANE DAILY
COMMUNITY

## 2023-09-20 RX ORDER — ACETAMINOPHEN 160 MG
2000 TABLET,DISINTEGRATING ORAL DAILY
COMMUNITY

## 2023-09-20 RX ORDER — SODIUM CHLORIDE 0.9 % (FLUSH) 0.9 %
5-40 SYRINGE (ML) INJECTION PRN
Status: DISCONTINUED | OUTPATIENT
Start: 2023-09-20 | End: 2023-09-20 | Stop reason: HOSPADM

## 2023-09-20 RX ORDER — SODIUM CHLORIDE 9 MG/ML
INJECTION, SOLUTION INTRAVENOUS PRN
Status: DISCONTINUED | OUTPATIENT
Start: 2023-09-20 | End: 2023-09-20 | Stop reason: HOSPADM

## 2023-09-20 RX ORDER — TRAZODONE HYDROCHLORIDE 50 MG/1
50 TABLET ORAL NIGHTLY
COMMUNITY

## 2023-09-20 RX ORDER — FENTANYL CITRATE 50 UG/ML
INJECTION, SOLUTION INTRAMUSCULAR; INTRAVENOUS PRN
Status: DISCONTINUED | OUTPATIENT
Start: 2023-09-20 | End: 2023-09-20 | Stop reason: HOSPADM

## 2023-09-20 RX ORDER — BACLOFEN 10 MG/1
10 TABLET ORAL 3 TIMES DAILY
Status: ON HOLD | COMMUNITY
End: 2023-09-20

## 2023-09-20 RX ORDER — METOPROLOL SUCCINATE 50 MG/1
50 TABLET, EXTENDED RELEASE ORAL DAILY
COMMUNITY

## 2023-09-20 NOTE — BRIEF OP NOTE
Cath:  Minimal (non-obstructive) CAD:     LAD p20     LCX patent     RCA patent  Normal LVF (EF 55%). No AVG/MR  RFA angioseal    F/U with Dr. Sabra Hartman 10/4/23 @ 11:40am.  Discussed smoking cessation.

## 2023-09-20 NOTE — DISCHARGE INSTRUCTIONS
Discharge Instructions:   Cardiac Catheterization/Angiography Discharge Instructions    *Check the puncture site frequently for swelling or bleeding. If you see any bleeding, lie down and apply pressure over the area and call 911. Notify your doctor for any redness, swelling, drainage or oozing from the puncture site. Notify your doctor for any fever or chills. *If the leg or arm with the puncture becomes cold, numb or painful, call Dr Ashley Morris at  [phone]    *Activity should be limited for the next 48 hours. Climb stairs as little as possible and avoid any stooping, bending or strenuous activity for 48 hours. No heavy lifting (anything over 10 pounds) for five days. *Do not drive for 24 hours. *You may resume your usual diet. Drink more fluids than usual.    *Have a responsible person drive you home and stay with you for at least 24 hours after your heart catheterization/angiography. *You may remove the bandage from your groin in 24 hours. You may shower in 24 hours. No tub baths, hot tubs or swimming for one week. Do not place any lotions, creams, powders, ointments over the puncture site for one week. You may place a clean band-aid over the puncture site each day for 5 days. Change this daily. Medications: Take medications as prescribed    Activity:     As tolerated except do not lift over 10 pounds for 5 days. Diet:     American Heart Association. Follow-up:     Follow up with Erlinda Gamboa MD on October 4th, 2023 at 11:40am.  [address] [place] [address]  [phone]      If you smoke, STOP! Post Sedation: Care Instructions  Overview  Sedation is the use of medicine to help you feel relaxed and comfortable during a procedure. The medicine is usually given in a vein (by IV). There are different levels of sedation. They range from being awake but relaxed to being completely unconscious. Which level you have will depend on the procedure and your needs.  You will

## 2023-09-20 NOTE — PROGRESS NOTES
Received patient from waiting area. Armband and allergies verbally confirmed with patient. Procedure explained and consents signed. 1140: TRANSFER - OUT REPORT:    Verbal report given to Don Copeland on Burnell Severance  being transferred to cath lab for routine progression of patient care       Report consisted of patient's Situation, Background, Assessment and   Recommendations(SBAR). Information from the following report(s) Nurse Handoff Report was reviewed with the receiving nurse. Lines:   Peripheral IV 09/20/23 Left Forearm (Active)   Site Assessment Clean, dry & intact 09/20/23 1042   Line Status Blood return noted 09/20/23 1042   Phlebitis Assessment No symptoms 09/20/23 1042   Infiltration Assessment 0 09/20/23 1042   Alcohol Cap Used No 09/20/23 1042   Dressing Status Clean, dry & intact; New dressing applied 09/20/23 1042   Dressing Type Transparent 09/20/23 1042   Dressing Intervention New 09/20/23 1042        Opportunity for questions and clarification was provided. Patient transported with:  Registered Nurse    22 [id number]: TRANSFER - IN REPORT:    Verbal report received from Elisha Hall  on Burnell Severance  being received from cath lab for routine post-op      Report consisted of patient's Situation, Background, Assessment and   Recommendations(SBAR). Information from the following report(s) Nurse Handoff Report was reviewed with the receiving nurse. Opportunity for questions and clarification was provided. Assessment completed upon patient's arrival to unit and care assumed. 1218: Patient received from cath lab. Patient with gauze & tegaderm to right groin. Site clean, dry and intact. No hematoma. Pulses palpable. VS stable. Patient with no complaints at this time. HOB flat. 1320: Patient HOB elevated 30 degrees. Patient with gauze & tegaderm to right groin. Site clean, dry and intact. No hematoma. Pulses palpable. VS stable. Patient with no complaints at this time.

## 2023-09-20 NOTE — H&P
Your medication list has been reviewed. Start the following medication(s): Baby Aspirin 81 mg. one daily with food and start Metoprolol. 2.  You have been advised regarding diet, exercise, and lifestyle. modification. 3.  Please call the office if there is any change in your cardiac symptoms. 4.  Explained to you the importance of controlling your cardiac risk factors. You need to have the following test/procedure(s) done: Cardiac Catheterization:  The patient understands and wishes to proceed with the cath to be performed as an outpatient at [facility] by Dr. Bob Drake. Schedule a follow-up visit in 2 weeks. Referrals:  Please make an appointment with a pulmonologist, Dr. Tip Hoyt for evaluation of emphysema and lung granuloma seen on EBT I also recommend that you monitor your BP. Target BP less than 130/80.

## 2023-12-08 ENCOUNTER — TELEPHONE (OUTPATIENT)
Facility: CLINIC | Age: 73
End: 2023-12-08

## 2023-12-08 NOTE — TELEPHONE ENCOUNTER
Called pt and spoke to him and his wife. Pt didn't want to wait to see provider at soonest appt available in may of 2024. Gave pt the provider hotline to help them find a new provider in [facility]. Jose Alejandro Youngblood    ----- Message from Judi Gan sent at 12/8/2023 11:02 AM EST -----  Subject: Appointment Request    Reason for Call: New Patient/New to Provider Appointment needed: New   Patient Request Appointment    QUESTIONS    Reason for appointment request? Available appointments did not meet   patient need     Additional Information for Provider? pt would like to est. care as soon as   possible before the next availability of 05/14/24.  Pt is diabetic and is   concerned about his meds running out.  ---------------------------------------------------------------------------  --------------  Radha WILLIAM  [phone]; OK to leave message on voicemail  ---------------------------------------------------------------------------  --------------  SCRIPT ANSWERS

## 2023-12-12 ENCOUNTER — TELEPHONE (OUTPATIENT)
Age: 73
End: 2023-12-12

## 2023-12-12 NOTE — TELEPHONE ENCOUNTER
----- Message from Daniela Bethany sent at 12/8/2023 11:33 AM EST -----  Subject: Appointment Request    Reason for Call: New Patient/New to Provider Appointment needed: New   Patient Request Appointment    QUESTIONS    Reason for appointment request? No appointments available during search     Additional Information for Provider?  Pls contact pt to schedule for an   appointment.  ---------------------------------------------------------------------------  --------------  [address]  [phone]; OK to leave message on voicemail  ---------------------------------------------------------------------------  --------------  SCRIPT ANSWERS

## 2024-01-02 SDOH — HEALTH STABILITY: PHYSICAL HEALTH: ON AVERAGE, HOW MANY DAYS PER WEEK DO YOU ENGAGE IN MODERATE TO STRENUOUS EXERCISE (LIKE A BRISK WALK)?: 0 DAYS

## 2024-01-04 ENCOUNTER — OFFICE VISIT (OUTPATIENT)
Age: 74
End: 2024-01-04
Payer: MEDICARE

## 2024-01-04 VITALS
BODY MASS INDEX: 30.86 KG/M2 | TEMPERATURE: 97.8 F | HEIGHT: 66 IN | HEART RATE: 54 BPM | RESPIRATION RATE: 18 BRPM | OXYGEN SATURATION: 93 % | DIASTOLIC BLOOD PRESSURE: 74 MMHG | WEIGHT: 192 LBS | SYSTOLIC BLOOD PRESSURE: 139 MMHG

## 2024-01-04 DIAGNOSIS — I09.9: ICD-10-CM

## 2024-01-04 DIAGNOSIS — Z00.00 MEDICARE ANNUAL WELLNESS VISIT, INITIAL: ICD-10-CM

## 2024-01-04 DIAGNOSIS — Z78.9 HISTORY OF INCARCERATION: ICD-10-CM

## 2024-01-04 DIAGNOSIS — Z00.00 ENCOUNTER FOR MEDICAL EXAMINATION TO ESTABLISH CARE: ICD-10-CM

## 2024-01-04 DIAGNOSIS — E10.9 TYPE 1 DIABETES MELLITUS WITHOUT COMPLICATION (HCC): ICD-10-CM

## 2024-01-04 DIAGNOSIS — Z87.891 PERSONAL HISTORY OF TOBACCO USE: Primary | ICD-10-CM

## 2024-01-04 DIAGNOSIS — M05.9 RHEUMATOID ARTHRITIS WITH POSITIVE RHEUMATOID FACTOR, INVOLVING UNSPECIFIED SITE (HCC): ICD-10-CM

## 2024-01-04 PROCEDURE — G0438 PPPS, INITIAL VISIT: HCPCS | Performed by: STUDENT IN AN ORGANIZED HEALTH CARE EDUCATION/TRAINING PROGRAM

## 2024-01-04 PROCEDURE — 1123F ACP DISCUSS/DSCN MKR DOCD: CPT | Performed by: STUDENT IN AN ORGANIZED HEALTH CARE EDUCATION/TRAINING PROGRAM

## 2024-01-04 PROCEDURE — 99203 OFFICE O/P NEW LOW 30 MIN: CPT | Performed by: STUDENT IN AN ORGANIZED HEALTH CARE EDUCATION/TRAINING PROGRAM

## 2024-01-04 PROCEDURE — G0296 VISIT TO DETERM LDCT ELIG: HCPCS | Performed by: STUDENT IN AN ORGANIZED HEALTH CARE EDUCATION/TRAINING PROGRAM

## 2024-01-04 RX ORDER — OXYBUTYNIN CHLORIDE 10 MG/1
10 TABLET, EXTENDED RELEASE ORAL DAILY
COMMUNITY
Start: 2023-12-11

## 2024-01-04 RX ORDER — DULOXETIN HYDROCHLORIDE 60 MG/1
120 CAPSULE, DELAYED RELEASE ORAL DAILY
Qty: 90 CAPSULE | Refills: 3 | Status: SHIPPED | OUTPATIENT
Start: 2024-01-04

## 2024-01-04 RX ORDER — MIRABEGRON 50 MG/1
50 TABLET, FILM COATED, EXTENDED RELEASE ORAL DAILY
COMMUNITY
Start: 2023-11-05

## 2024-01-04 RX ORDER — PREDNISONE 5 MG/1
5 TABLET ORAL
COMMUNITY
Start: 2023-10-29

## 2024-01-04 RX ORDER — DULOXETIN HYDROCHLORIDE 60 MG/1
120 CAPSULE, DELAYED RELEASE ORAL DAILY
COMMUNITY
Start: 2024-01-03 | End: 2024-01-04 | Stop reason: SDUPTHER

## 2024-01-04 RX ORDER — ASPIRIN 81 MG/1
81 TABLET ORAL DAILY
Qty: 90 TABLET | Refills: 3 | Status: SHIPPED | OUTPATIENT
Start: 2024-01-04

## 2024-01-04 RX ORDER — ROSUVASTATIN CALCIUM 20 MG/1
20 TABLET, COATED ORAL DAILY
COMMUNITY
Start: 2024-01-02 | End: 2024-01-04 | Stop reason: SDUPTHER

## 2024-01-04 RX ORDER — AMLODIPINE BESYLATE 10 MG/1
10 TABLET ORAL DAILY
Qty: 90 TABLET | Refills: 3 | Status: SHIPPED | OUTPATIENT
Start: 2024-01-04

## 2024-01-04 RX ORDER — ROSUVASTATIN CALCIUM 20 MG/1
20 TABLET, COATED ORAL DAILY
Qty: 60 TABLET | Refills: 3 | Status: SHIPPED | OUTPATIENT
Start: 2024-01-04

## 2024-01-04 RX ORDER — ALBUTEROL SULFATE 90 UG/1
2 AEROSOL, METERED RESPIRATORY (INHALATION) EVERY 4 HOURS PRN
COMMUNITY

## 2024-01-04 RX ORDER — LISINOPRIL 40 MG/1
40 TABLET ORAL DAILY
Qty: 90 TABLET | Refills: 2 | Status: SHIPPED | OUTPATIENT
Start: 2024-01-04

## 2024-01-04 RX ORDER — ACYCLOVIR 400 MG/1
TABLET ORAL
COMMUNITY
Start: 2023-12-08

## 2024-01-04 SDOH — ECONOMIC STABILITY: HOUSING INSECURITY
IN THE LAST 12 MONTHS, WAS THERE A TIME WHEN YOU DID NOT HAVE A STEADY PLACE TO SLEEP OR SLEPT IN A SHELTER (INCLUDING NOW)?: NO

## 2024-01-04 SDOH — ECONOMIC STABILITY: FOOD INSECURITY: WITHIN THE PAST 12 MONTHS, THE FOOD YOU BOUGHT JUST DIDN'T LAST AND YOU DIDN'T HAVE MONEY TO GET MORE.: NEVER TRUE

## 2024-01-04 SDOH — ECONOMIC STABILITY: FOOD INSECURITY: WITHIN THE PAST 12 MONTHS, YOU WORRIED THAT YOUR FOOD WOULD RUN OUT BEFORE YOU GOT MONEY TO BUY MORE.: NEVER TRUE

## 2024-01-04 SDOH — ECONOMIC STABILITY: INCOME INSECURITY: HOW HARD IS IT FOR YOU TO PAY FOR THE VERY BASICS LIKE FOOD, HOUSING, MEDICAL CARE, AND HEATING?: NOT VERY HARD

## 2024-01-04 NOTE — PROGRESS NOTES
Identified pt with two pt identifiers(name and ). Reviewed record in preparation for visit and have obtained necessary documentation.    Complains of back and knees for years bothering him the most. Hands hurt as well.    Previous PCP Soco Smith [phone]    Patient choice to switch Physician  Chief Complaint   Patient presents with    New Patient        Health Maintenance Due   Topic    COVID-19 Vaccine (1)    Pneumococcal 65+ years Vaccine (1 - PCV)    Lipids     Hepatitis C screen     Shingles vaccine (1 of 2)    Respiratory Syncytial Virus (RSV) Pregnant or age 60 yrs+ (1 - 1-dose 60+ series)    Depression Screen     Flu vaccine (1)    Annual Wellness Visit (Medicare Advantage)        Vitals:    24 1311   BP: 139/74   Site: Left Upper Arm   Position: Sitting   Cuff Size: Medium Adult   Pulse: 54   Resp: 18   Temp: 97.8 °F (36.6 °C)   TempSrc: Oral   SpO2: 93%   Weight: 87.1 kg (192 lb)   Height: 1.67 m (5' 5.75\")           Coordination of Care Questionnaire:  :   1. Have you been to the ER, urgent care clinic since your last visit?  Hospitalized since your last visit? New patient     2. Have you seen or consulted any other health care providers outside of the [facility] since your last visit?  Include any pap smears or colon screening.  New patient    This patient is accompanied in the office by his self.  I have received verbal consent from Deric Mabry to discuss any/all medical information while they are present in the room.

## 2024-01-04 NOTE — PROGRESS NOTES
Kirk Goncalves [facility]  [address] [place] [address] [phone]             Date of visit: 1/4/2024         I have reviewed the patient's medical history in detail and updated the computerized patient record.     History obtained from: the patient.    Concerns today   (Patient understands that medical problems addressed today may incur additional cost as this is a preventive visit)  -establish care    #Rheumatoid arthritis  - long standing hx. Had a rheumatologist, but retired. Had been on DMARD's but not currently.    #DJD of the cervical and Lumbar spine  - sees pain specialist and ortho. Had injections in the past. Currently well controlled on gabapentin 1200mg TID and norco . Has appt on the 8th.    #Type I DM on insulin  - Takes long acting 28 U, but his previous brand is no longer covered by insurance, Lispro SSI, usually 8U.  - sees endocrinologist. Has fu scheduled.    #HTN  - takes lisinopril 40mg and amlodipine 10mg. Ell controlled. No s/s.    #Depression  - taking duloxetine 120mg daily, needs refill. Mood has been \"pretty good.\" No SI/SIT.    #hx of rheumatic fever  - recent LHC essentially normal.    #Nicotine dependence  - smokes 1.5 PPD.  - has been working on cutting back.  - not currently interested in MAT.    #Psoriasis  - on multiple creams. Long term steroids. Would like to see a dermatologist.    Hmx:  Social/Behavoral/Lifestyle  Tobacco - yes, currently 1.5 PPD. See above.  EtOH - denies consuming more than 14 alcoholic drinks per week or mor than four drinks on one occasion.  Illicit drug use - occasional MJ  Sexual Behavior - minimal lifetime partners, female only, currently monogomous x20 years. denies hx of STI.  Depression - PHQ-2 negative  Diet and Exercise - Recommended at lease 150min of moderate intensity aerobic exercise per week, as well as muscle strengthening at least twice per week. Discussed decrease in daily intake of saturated

## 2024-01-05 LAB
ALBUMIN SERPL-MCNC: 3.4 G/DL (ref 3.5–5)
ALBUMIN/GLOB SERPL: 1 (ref 1.1–2.2)
ALP SERPL-CCNC: 99 U/L (ref 45–117)
ALT SERPL-CCNC: 28 U/L (ref 12–78)
ANION GAP SERPL CALC-SCNC: 6 MMOL/L (ref 5–15)
AST SERPL-CCNC: 21 U/L (ref 15–37)
BILIRUB SERPL-MCNC: 0.6 MG/DL (ref 0.2–1)
BUN SERPL-MCNC: 17 MG/DL (ref 6–20)
BUN/CREAT SERPL: 19 (ref 12–20)
CALCIUM SERPL-MCNC: 8.7 MG/DL (ref 8.5–10.1)
CHLORIDE SERPL-SCNC: 104 MMOL/L (ref 97–108)
CHOLEST SERPL-MCNC: 127 MG/DL
CO2 SERPL-SCNC: 28 MMOL/L (ref 21–32)
CREAT SERPL-MCNC: 0.91 MG/DL (ref 0.7–1.3)
ERYTHROCYTE [DISTWIDTH] IN BLOOD BY AUTOMATED COUNT: 12.6 % (ref 11.5–14.5)
EST. AVERAGE GLUCOSE BLD GHB EST-MCNC: 154 MG/DL
GLOBULIN SER CALC-MCNC: 3.5 G/DL (ref 2–4)
GLUCOSE SERPL-MCNC: 203 MG/DL (ref 65–100)
HBA1C MFR BLD: 7 % (ref 4–5.6)
HCT VFR BLD AUTO: 49.4 % (ref 36.6–50.3)
HCV AB SER IA-ACNC: 0.16 INDEX
HCV AB SERPL QL IA: NONREACTIVE
HDLC SERPL-MCNC: 62 MG/DL
HDLC SERPL: 2 (ref 0–5)
HGB BLD-MCNC: 17.5 G/DL (ref 12.1–17)
HIV 1+2 AB+HIV1 P24 AG SERPL QL IA: NONREACTIVE
HIV 1/2 RESULT COMMENT: NORMAL
LDLC SERPL CALC-MCNC: 48.2 MG/DL (ref 0–100)
MCH RBC QN AUTO: 35 PG (ref 26–34)
MCHC RBC AUTO-ENTMCNC: 35.4 G/DL (ref 30–36.5)
MCV RBC AUTO: 98.8 FL (ref 80–99)
NRBC # BLD: 0 K/UL (ref 0–0.01)
NRBC BLD-RTO: 0 PER 100 WBC
PLATELET # BLD AUTO: 211 K/UL (ref 150–400)
PMV BLD AUTO: 10.9 FL (ref 8.9–12.9)
POTASSIUM SERPL-SCNC: 4.2 MMOL/L (ref 3.5–5.1)
PROT SERPL-MCNC: 6.9 G/DL (ref 6.4–8.2)
RBC # BLD AUTO: 5 M/UL (ref 4.1–5.7)
SODIUM SERPL-SCNC: 138 MMOL/L (ref 136–145)
TRIGL SERPL-MCNC: 84 MG/DL
VLDLC SERPL CALC-MCNC: 16.8 MG/DL
WBC # BLD AUTO: 6.9 K/UL (ref 4.1–11.1)

## 2024-01-09 LAB
GAMMA INTERFERON BACKGROUND BLD IA-ACNC: 0.01 IU/ML
M TB IFN-G BLD-IMP: NEGATIVE
M TB IFN-G CD4+ T-CELLS BLD-ACNC: 0.01 IU/ML
M TBIFN-G CD4+ CD8+T-CELLS BLD-ACNC: 0.01 IU/ML
MITOGEN IGNF BLD-ACNC: >10 IU/ML
QUANTIFERON, INCUBATION: NORMAL
SERVICE CMNT-IMP: NORMAL

## 2024-01-16 ENCOUNTER — TELEPHONE (OUTPATIENT)
Age: 74
End: 2024-01-16

## 2024-01-16 DIAGNOSIS — M19.90 ARTHRITIS: Primary | ICD-10-CM

## 2024-01-16 NOTE — PROGRESS NOTES
Diagnoses and all orders for this visit:    Arthritis  -     [facility] - Ross Marie MD, Pain Medicine, [place] (Sandy Amos)      Joby Leigh MD, MPH  [facility] [place]

## 2024-01-16 NOTE — TELEPHONE ENCOUNTER
----- Message from Vielka Gallego sent at 1/16/2024  3:56 PM EST -----  Subject: Referral Request    Reason for referral request? pain management for arthritis   Provider patient wants to be referred to(if known):     Provider Phone Number(if known):    Additional Information for Provider?   ---------------------------------------------------------------------------  --------------  CALL BACK INFO    [phone]; OK to leave message on voicemail  ---------------------------------------------------------------------------  --------------

## 2024-01-17 NOTE — TELEPHONE ENCOUNTER
Non specific message left on patient's VMB to make him aware of referral being placed and the number to contact for an appointment.

## 2024-01-22 ENCOUNTER — TELEPHONE (OUTPATIENT)
Age: 74
End: 2024-01-22

## 2024-01-22 DIAGNOSIS — M05.9 RHEUMATOID ARTHRITIS WITH POSITIVE RHEUMATOID FACTOR, INVOLVING UNSPECIFIED SITE (HCC): Primary | ICD-10-CM

## 2024-01-22 NOTE — TELEPHONE ENCOUNTER
Pt is requesting a new referral for Rheumatology. He would like to see Dr. Osmar Bejarano with [facility]'s Rheumatology department. Please update and fax to their department at [phone]. Staff stated pt's records will need to be with referral.     Their contact number is [phone], must ask for Rheumatology department.    Pt is requesting an update.    Thank you

## 2024-01-22 NOTE — TELEPHONE ENCOUNTER
New referral order placed. Please Update pt and fax necessary documents to the number below.    Thanks!    Joby Leigh MD, MPH  [facility]

## 2024-01-29 ENCOUNTER — HOSPITAL ENCOUNTER (OUTPATIENT)
Facility: HOSPITAL | Age: 74
Discharge: HOME | End: 2024-02-01
Attending: STUDENT IN AN ORGANIZED HEALTH CARE EDUCATION/TRAINING PROGRAM
Payer: MEDICARE

## 2024-01-29 VITALS — HEIGHT: 66 IN | WEIGHT: 192 LBS | BODY MASS INDEX: 30.86 KG/M2

## 2024-01-29 DIAGNOSIS — Z87.891 PERSONAL HISTORY OF TOBACCO USE: ICD-10-CM

## 2024-01-29 PROCEDURE — 71271 CT THORAX LUNG CANCER SCR C-: CPT

## 2024-01-30 ENCOUNTER — TELEPHONE (OUTPATIENT)
Age: 74
End: 2024-01-30

## 2024-01-30 NOTE — TELEPHONE ENCOUNTER
----- Message from Eliecer Ceballos sent at 1/30/2024 12:53 PM EST -----  Subject: Message to Provider    QUESTIONS  Information for Provider? patient needs lab results sent to [facility]   Rheumatology asa. Please fax results to [phone]  ---------------------------------------------------------------------------  --------------  CALL BACK INFO  [phone]; OK to leave message on voicemail  ---------------------------------------------------------------------------  --------------  SCRIPT ANSWERS  Relationship to Patient? Self

## 2024-02-06 ENCOUNTER — OFFICE VISIT (OUTPATIENT)
Age: 74
End: 2024-02-06
Payer: MEDICARE

## 2024-02-06 VITALS
SYSTOLIC BLOOD PRESSURE: 138 MMHG | TEMPERATURE: 98.1 F | HEART RATE: 67 BPM | OXYGEN SATURATION: 91 % | RESPIRATION RATE: 18 BRPM | HEIGHT: 66 IN | WEIGHT: 193 LBS | BODY MASS INDEX: 31.02 KG/M2 | DIASTOLIC BLOOD PRESSURE: 79 MMHG

## 2024-02-06 DIAGNOSIS — M05.9 RHEUMATOID ARTHRITIS WITH POSITIVE RHEUMATOID FACTOR, INVOLVING UNSPECIFIED SITE (HCC): ICD-10-CM

## 2024-02-06 DIAGNOSIS — K59.00 CONSTIPATION, UNSPECIFIED CONSTIPATION TYPE: ICD-10-CM

## 2024-02-06 DIAGNOSIS — L40.9 PSORIASIS: Primary | ICD-10-CM

## 2024-02-06 DIAGNOSIS — G89.29 OTHER CHRONIC PAIN: ICD-10-CM

## 2024-02-06 PROCEDURE — 99214 OFFICE O/P EST MOD 30 MIN: CPT | Performed by: STUDENT IN AN ORGANIZED HEALTH CARE EDUCATION/TRAINING PROGRAM

## 2024-02-06 PROCEDURE — 1123F ACP DISCUSS/DSCN MKR DOCD: CPT | Performed by: STUDENT IN AN ORGANIZED HEALTH CARE EDUCATION/TRAINING PROGRAM

## 2024-02-06 ASSESSMENT — PATIENT HEALTH QUESTIONNAIRE - PHQ9
SUM OF ALL RESPONSES TO PHQ QUESTIONS 1-9: 0
SUM OF ALL RESPONSES TO PHQ QUESTIONS 1-9: 0
SUM OF ALL RESPONSES TO PHQ9 QUESTIONS 1 & 2: 0
2. FEELING DOWN, DEPRESSED OR HOPELESS: 0
1. LITTLE INTEREST OR PLEASURE IN DOING THINGS: 0
SUM OF ALL RESPONSES TO PHQ QUESTIONS 1-9: 0
SUM OF ALL RESPONSES TO PHQ QUESTIONS 1-9: 0

## 2024-02-06 NOTE — PROGRESS NOTES
Deric Mabry is a 73 y.o. male      Chief Complaint   Patient presents with    Follow-up     Patient is coming in for a follow up from first visit. No other concerns.        1. Have you been to the ER, urgent care clinic since your last visit?  Hospitalized since your last visit?No    2. Have you seen or consulted any other health care providers outside of the [facility] since your last visit?  Include any pap smears or colon screening. no    Vitals:    02/06/24 1050   BP: 138/79   Site: Right Upper Arm   Position: Sitting   Pulse: 67   Resp: 18   Temp: 98.1 °F (36.7 °C)   TempSrc: Oral   SpO2: 91%   Weight: 87.5 kg (193 lb)   Height: 1.676 m (5' 6\")            Health Maintenance Due   Topic Date Due    COVID-19 Vaccine (1) Never done    Pneumococcal 65+ years Vaccine (1 - PCV) Never done    Diabetic foot exam  Never done    Diabetic Alb to Cr ratio (uACR) test  Never done    Diabetic retinal exam  Never done    Shingles vaccine (1 of 2) Never done    Respiratory Syncytial Virus (RSV) Pregnant or age 60 yrs+ (1 - 1-dose 60+ series) Never done    Depression Screen  07/06/2023    Flu vaccine (1) Never done         Medication Reconciliation completed, changes noted.  Please  Update medication list.

## 2024-02-06 NOTE — PROGRESS NOTES
[facility]  [facility]   [facility]      Chief Complaint:   Chief Complaint   Patient presents with    Follow-up     Patient is coming in for a follow up from first visit. No other concerns.        SUBJECTIVE:  Deric Mabry is a 73 y.o. male who presents for f/u.    - Had pain doctor for 22 years, but that physician no longer takes his insurance. Last saw him in November 2023.   - Chronic pain has been \"awful\" since he has been unable to see his pain physician  - Chronic pain is mostly in knees, ankles, shoulders, back. Present for many years.   - Was referred to [facility] pain medicine, but not given appointment due to insufficient medical records   - Pt currently taking Gabapentin and Advil     - Also reports constipation.   - Has BM every 3-4 days   - Taking ex-lax without much help  - Pt UTD on colonoscopy  - No black or bloody stools  - Not taking opioids for past 2-3 months    PMHx:  Past Medical History:   Diagnosis Date    Adverse effect of anesthesia     kept pt in ICU longer after colon surgery d/t smoking    Arthritis     psoriatic    Chronic obstructive pulmonary disease (HCC)     Chronic pain     neck/back/legs/knees especially right/ankles/right hand/fingers    Diabetes (HCC)     type 1    Hypertension     Ill-defined condition     slight rotator cuff tears on both shoulders/injection in left shoulder    Ill-defined condition     DDD in back and neck - sciatic pain - hx endoscopic injections in back    Ill-defined condition     peripheral neuropathy    Ill-defined condition     increased cholesterol    Rheumatic fever     Thyroid disease     \"a little bit high\"/improving when subsequent blood tests - monitoring       Meds:   Current Outpatient Medications   Medication Sig Dispense Refill    Ibuprofen (ADVIL PO) Take by mouth      albuterol sulfate HFA (PROVENTIL;VENTOLIN;PROAIR) 108 (90 Base) MCG/ACT inhaler Inhale 2 puffs into the

## 2024-03-29 ENCOUNTER — TRANSCRIBE ORDERS (OUTPATIENT)
Facility: HOSPITAL | Age: 74
End: 2024-03-29

## 2024-03-29 ENCOUNTER — HOSPITAL ENCOUNTER (OUTPATIENT)
Facility: HOSPITAL | Age: 74
End: 2024-03-29
Payer: MEDICARE

## 2024-03-29 DIAGNOSIS — M51.37 DEGENERATION OF LUMBOSACRAL INTERVERTEBRAL DISC: ICD-10-CM

## 2024-03-29 DIAGNOSIS — M51.37 DEGENERATION OF LUMBOSACRAL INTERVERTEBRAL DISC: Primary | ICD-10-CM

## 2024-03-29 PROCEDURE — 72110 X-RAY EXAM L-2 SPINE 4/>VWS: CPT

## 2024-04-12 DIAGNOSIS — L40.9 PSORIASIS: ICD-10-CM

## 2024-04-12 DIAGNOSIS — M05.9 RHEUMATOID ARTHRITIS WITH POSITIVE RHEUMATOID FACTOR, INVOLVING UNSPECIFIED SITE (HCC): Primary | ICD-10-CM

## 2024-04-12 NOTE — TELEPHONE ENCOUNTER
----- Message from Gayatri Bojorquez sent at 4/12/2024  2:28 PM EDT -----  Subject: Message to Provider    QUESTIONS  Information for Provider? Patient said he would like the provider to   prescribe the arthritis medication for him. \":stevo\"  ---------------------------------------------------------------------------  --------------  CALL BACK INFO  [phone]; OK to leave message on voicemail  ---------------------------------------------------------------------------  --------------  SCRIPT ANSWERS  Relationship to Patient? Self

## 2024-04-29 RX ORDER — POTASSIUM CHLORIDE 750 MG/1
10 TABLET, EXTENDED RELEASE ORAL
COMMUNITY
End: 2024-04-29 | Stop reason: SDUPTHER

## 2024-04-29 NOTE — TELEPHONE ENCOUNTER
I called and spoke with the patient, he states he was previously prescribed Tremfya for Psoriasis.  He isn't sure of the dosage.  He is currently using the clobetasol cream which stops the itching but not resolving the issue.  He states the [facility] Rheumatologist we referred him to can't see him because their schedule is full.  He would like a dermatologist and a different rheumatology referral.  He is also requesting a refill of potassium which I have pinned.       I called [facility] Rheumatology they stated the referral from 1/2024 was denied but the 2/2024 referral was not denied and they couldn't tell me the current status.  They couldn't fax me the denial letter.  The will have the Referral  contact me with more information.  They are requesting any supporting documentation and labs which I will fax again to [phone].  Referrals can be sent to [phone].

## 2024-04-30 RX ORDER — POTASSIUM CHLORIDE 750 MG/1
10 TABLET, EXTENDED RELEASE ORAL DAILY
Qty: 60 TABLET | Refills: 3 | Status: SHIPPED | OUTPATIENT
Start: 2024-04-30 | End: 2024-05-02

## 2024-05-02 NOTE — TELEPHONE ENCOUNTER
New referrals sent as requested.      Diagnoses and all orders for this visit:    Rheumatoid arthritis with positive rheumatoid factor, involving unspecified site (HCC)  -     [facility] - Terrance Medina MD, Rheumatology, [place]    Psoriasis  -     External Referral To Dermatology  -     [facility] - Terrance Medina MD, Rheumatology, [place] Leigh MD, MPH  [facility]

## 2024-05-28 ENCOUNTER — OFFICE VISIT (OUTPATIENT)
Age: 74
End: 2024-05-28
Payer: MEDICARE

## 2024-05-28 VITALS
TEMPERATURE: 98.2 F | DIASTOLIC BLOOD PRESSURE: 65 MMHG | WEIGHT: 200.4 LBS | SYSTOLIC BLOOD PRESSURE: 133 MMHG | BODY MASS INDEX: 32.21 KG/M2 | HEIGHT: 66 IN | OXYGEN SATURATION: 89 % | HEART RATE: 58 BPM

## 2024-05-28 DIAGNOSIS — L40.9 PSORIASIS: Primary | ICD-10-CM

## 2024-05-28 PROCEDURE — 99213 OFFICE O/P EST LOW 20 MIN: CPT | Performed by: STUDENT IN AN ORGANIZED HEALTH CARE EDUCATION/TRAINING PROGRAM

## 2024-05-28 PROCEDURE — 1123F ACP DISCUSS/DSCN MKR DOCD: CPT | Performed by: STUDENT IN AN ORGANIZED HEALTH CARE EDUCATION/TRAINING PROGRAM

## 2024-05-28 RX ORDER — NICOTINE 21 MG/24HR
1 PATCH, TRANSDERMAL 24 HOURS TRANSDERMAL EVERY 24 HOURS
COMMUNITY

## 2024-05-28 RX ORDER — ACYCLOVIR 400 MG/1
TABLET ORAL
COMMUNITY
Start: 2023-03-17

## 2024-05-28 ASSESSMENT — PATIENT HEALTH QUESTIONNAIRE - PHQ9
SUM OF ALL RESPONSES TO PHQ QUESTIONS 1-9: 0
SUM OF ALL RESPONSES TO PHQ9 QUESTIONS 1 & 2: 0
1. LITTLE INTEREST OR PLEASURE IN DOING THINGS: NOT AT ALL
2. FEELING DOWN, DEPRESSED OR HOPELESS: NOT AT ALL

## 2024-05-28 NOTE — PROGRESS NOTES
Deric Mabry is a 74 y.o. male      Chief Complaint   Patient presents with    Medication Refill     Requesting Tremfya       \"Have you been to the ER, urgent care clinic since your last visit?  Hospitalized since your last visit?\"    NO    “Have you seen or consulted any other health care providers outside of [facility] since your last visit?”    NO          Click Here for Release of Records Request    Vitals:    05/28/24 1056 05/28/24 1110   BP: (!) 143/55 133/65   Site: Left Upper Arm Right Upper Arm   Position: Sitting Sitting   Cuff Size: Medium Adult Medium Adult   Pulse: 58    Temp: 98.2 °F (36.8 °C)    TempSrc: Oral    SpO2: (!) 88% (!) 89%   Weight: 90.9 kg (200 lb 6.4 oz)    Height: 1.676 m (5' 5.98\")            Medication Reconciliation Completed, changes notes. Please Update medication list.

## 2024-05-28 NOTE — PROGRESS NOTES
[facility] Note      History of Present Illness:     Chief Complaint   Patient presents with    Medication Refill     Requesting Carolinealannahgodfrey Mabry is a 74 y.o. male with a PMH notable for RA, DJD of the spine, T1DM on inuslin, HTN, Depression, psoriasis, who presents to followup on severe and uncontrolled psoriasis. He has not been able to get an appointment with a number of previous referrals due to insurance barriers? Unclear reasons. He has continued to use clobetasol daily.      PMH (REVIEWED):  Past Medical History:   Diagnosis Date    Adverse effect of anesthesia     kept pt in ICU longer after colon surgery d/t smoking    Arthritis     psoriatic    Chronic obstructive pulmonary disease (HCC)     Chronic pain     neck/back/legs/knees especially right/ankles/right hand/fingers    Diabetes (HCC)     type 1    Hypertension     Ill-defined condition     slight rotator cuff tears on both shoulders/injection in left shoulder    Ill-defined condition     DDD in back and neck - sciatic pain - hx endoscopic injections in back    Ill-defined condition     peripheral neuropathy    Ill-defined condition     increased cholesterol    Rheumatic fever     Thyroid disease     \"a little bit high\"/improving when subsequent blood tests - monitoring       Current Medications/Allergies (REVIEWED):     Current Outpatient Medications on File Prior to Visit   Medication Sig Dispense Refill    Continuous Glucose Sensor (DEXCOM G7 SENSOR) MISC 1 EACH SUBCUTANEOUS EVERY 10 DAYS for 30 days      nicotine (NICODERM CQ) 14 MG/24HR Place 1 patch onto the skin every 24 hours      Ibuprofen (ADVIL PO) Take by mouth      albuterol sulfate HFA (PROVENTIL;VENTOLIN;PROAIR) 108 (90 Base) MCG/ACT inhaler Inhale 2 puffs into the lungs every 4 hours as needed      oxyBUTYnin (DITROPAN-XL) 10 MG extended release tablet Take 1 tablet by mouth daily      DULoxetine (CYMBALTA) 60 MG extended release capsule Take 2

## 2024-05-29 ENCOUNTER — APPOINTMENT (OUTPATIENT)
Facility: HOSPITAL | Age: 74
DRG: 357 | End: 2024-05-29
Attending: EMERGENCY MEDICINE
Payer: MEDICARE

## 2024-05-29 ENCOUNTER — TELEPHONE (OUTPATIENT)
Age: 74
End: 2024-05-29

## 2024-05-29 ENCOUNTER — APPOINTMENT (OUTPATIENT)
Facility: HOSPITAL | Age: 74
DRG: 357 | End: 2024-05-29
Payer: MEDICARE

## 2024-05-29 ENCOUNTER — HOSPITAL ENCOUNTER (INPATIENT)
Facility: HOSPITAL | Age: 74
LOS: 2 days | Discharge: HOME | DRG: 357 | End: 2024-05-31
Attending: EMERGENCY MEDICINE | Admitting: EMERGENCY MEDICINE
Payer: MEDICARE

## 2024-05-29 DIAGNOSIS — K57.31 DIVERTICULAR HEMORRHAGE: Primary | ICD-10-CM

## 2024-05-29 PROBLEM — K92.2 GI BLEED: Status: ACTIVE | Noted: 2024-05-29

## 2024-05-29 LAB
ABO + RH BLD: NORMAL
ALBUMIN SERPL-MCNC: 3 G/DL (ref 3.5–5)
ALBUMIN/GLOB SERPL: 1.1 (ref 1.1–2.2)
ALP SERPL-CCNC: 80 U/L (ref 45–117)
ALT SERPL-CCNC: 29 U/L (ref 12–78)
ANION GAP SERPL CALC-SCNC: 2 MMOL/L (ref 5–15)
AST SERPL-CCNC: 22 U/L (ref 15–37)
BASOPHILS # BLD: 0.1 K/UL (ref 0–0.1)
BASOPHILS NFR BLD: 1 % (ref 0–1)
BILIRUB SERPL-MCNC: 0.6 MG/DL (ref 0.2–1)
BLOOD GROUP ANTIBODIES SERPL: NORMAL
BUN SERPL-MCNC: 26 MG/DL (ref 6–20)
BUN/CREAT SERPL: 30 (ref 12–20)
CALCIUM SERPL-MCNC: 8.2 MG/DL (ref 8.5–10.1)
CHLORIDE SERPL-SCNC: 109 MMOL/L (ref 97–108)
CO2 SERPL-SCNC: 28 MMOL/L (ref 21–32)
CREAT SERPL-MCNC: 0.88 MG/DL (ref 0.7–1.3)
DIFFERENTIAL METHOD BLD: ABNORMAL
EOSINOPHIL # BLD: 0.6 K/UL (ref 0–0.4)
EOSINOPHIL NFR BLD: 9 % (ref 0–7)
ERYTHROCYTE [DISTWIDTH] IN BLOOD BY AUTOMATED COUNT: 12.7 % (ref 11.5–14.5)
GLOBULIN SER CALC-MCNC: 2.8 G/DL (ref 2–4)
GLUCOSE BLD STRIP.AUTO-MCNC: 146 MG/DL (ref 65–117)
GLUCOSE BLD STRIP.AUTO-MCNC: 195 MG/DL (ref 65–117)
GLUCOSE SERPL-MCNC: 208 MG/DL (ref 65–100)
HCT VFR BLD AUTO: 31.2 % (ref 36.6–50.3)
HCT VFR BLD AUTO: 35.5 % (ref 36.6–50.3)
HGB BLD-MCNC: 11 G/DL (ref 12.1–17)
HGB BLD-MCNC: 12.4 G/DL (ref 12.1–17)
IMM GRANULOCYTES # BLD AUTO: 0 K/UL (ref 0–0.04)
IMM GRANULOCYTES NFR BLD AUTO: 0 % (ref 0–0.5)
INR PPP: 1 (ref 0.9–1.1)
LYMPHOCYTES # BLD: 1.6 K/UL (ref 0.8–3.5)
LYMPHOCYTES NFR BLD: 23 % (ref 12–49)
MCH RBC QN AUTO: 34.4 PG (ref 26–34)
MCHC RBC AUTO-ENTMCNC: 34.9 G/DL (ref 30–36.5)
MCV RBC AUTO: 98.6 FL (ref 80–99)
MONOCYTES # BLD: 0.5 K/UL (ref 0–1)
MONOCYTES NFR BLD: 8 % (ref 5–13)
NEUTS SEG # BLD: 4.1 K/UL (ref 1.8–8)
NEUTS SEG NFR BLD: 59 % (ref 32–75)
NRBC # BLD: 0 K/UL (ref 0–0.01)
NRBC BLD-RTO: 0 PER 100 WBC
PLATELET # BLD AUTO: 229 K/UL (ref 150–400)
PMV BLD AUTO: 10.5 FL (ref 8.9–12.9)
POTASSIUM SERPL-SCNC: 4 MMOL/L (ref 3.5–5.1)
PROT SERPL-MCNC: 5.8 G/DL (ref 6.4–8.2)
PROTHROMBIN TIME: 10.4 SEC (ref 9–11.1)
RBC # BLD AUTO: 3.6 M/UL (ref 4.1–5.7)
SERVICE CMNT-IMP: ABNORMAL
SERVICE CMNT-IMP: ABNORMAL
SODIUM SERPL-SCNC: 139 MMOL/L (ref 136–145)
SPECIMEN EXP DATE BLD: NORMAL
TROPONIN I SERPL HS-MCNC: 10 NG/L (ref 0–76)
WBC # BLD AUTO: 6.8 K/UL (ref 4.1–11.1)

## 2024-05-29 PROCEDURE — C1769 GUIDE WIRE: HCPCS

## 2024-05-29 PROCEDURE — 77002 NEEDLE LOCALIZATION BY XRAY: CPT

## 2024-05-29 PROCEDURE — C1887 CATHETER, GUIDING: HCPCS

## 2024-05-29 PROCEDURE — C1894 INTRO/SHEATH, NON-LASER: HCPCS

## 2024-05-29 PROCEDURE — 6360000004 HC RX CONTRAST MEDICATION: Performed by: INTERNAL MEDICINE

## 2024-05-29 PROCEDURE — 74174 CTA ABD&PLVS W/CONTRAST: CPT

## 2024-05-29 PROCEDURE — 85014 HEMATOCRIT: CPT

## 2024-05-29 PROCEDURE — B4151ZZ FLUOROSCOPY OF INFERIOR MESENTERIC ARTERY USING LOW OSMOLAR CONTRAST: ICD-10-PCS | Performed by: STUDENT IN AN ORGANIZED HEALTH CARE EDUCATION/TRAINING PROGRAM

## 2024-05-29 PROCEDURE — 99285 EMERGENCY DEPT VISIT HI MDM: CPT

## 2024-05-29 PROCEDURE — 86850 RBC ANTIBODY SCREEN: CPT

## 2024-05-29 PROCEDURE — 85018 HEMOGLOBIN: CPT

## 2024-05-29 PROCEDURE — 2060000000 HC ICU INTERMEDIATE R&B

## 2024-05-29 PROCEDURE — C1889 IMPLANT/INSERT DEVICE, NOC: HCPCS

## 2024-05-29 PROCEDURE — 84484 ASSAY OF TROPONIN QUANT: CPT

## 2024-05-29 PROCEDURE — C1760 CLOSURE DEV, VASC: HCPCS

## 2024-05-29 PROCEDURE — 6360000004 HC RX CONTRAST MEDICATION: Performed by: STUDENT IN AN ORGANIZED HEALTH CARE EDUCATION/TRAINING PROGRAM

## 2024-05-29 PROCEDURE — C9113 INJ PANTOPRAZOLE SODIUM, VIA: HCPCS | Performed by: EMERGENCY MEDICINE

## 2024-05-29 PROCEDURE — 6370000000 HC RX 637 (ALT 250 FOR IP): Performed by: EMERGENCY MEDICINE

## 2024-05-29 PROCEDURE — 6360000002 HC RX W HCPCS: Performed by: EMERGENCY MEDICINE

## 2024-05-29 PROCEDURE — 2500000003 HC RX 250 WO HCPCS: Performed by: STUDENT IN AN ORGANIZED HEALTH CARE EDUCATION/TRAINING PROGRAM

## 2024-05-29 PROCEDURE — 86901 BLOOD TYPING SEROLOGIC RH(D): CPT

## 2024-05-29 PROCEDURE — 36415 COLL VENOUS BLD VENIPUNCTURE: CPT

## 2024-05-29 PROCEDURE — 85610 PROTHROMBIN TIME: CPT

## 2024-05-29 PROCEDURE — 80053 COMPREHEN METABOLIC PANEL: CPT

## 2024-05-29 PROCEDURE — 2709999900 HC NON-CHARGEABLE SUPPLY

## 2024-05-29 PROCEDURE — 86900 BLOOD TYPING SEROLOGIC ABO: CPT

## 2024-05-29 PROCEDURE — 6370000000 HC RX 637 (ALT 250 FOR IP): Performed by: STUDENT IN AN ORGANIZED HEALTH CARE EDUCATION/TRAINING PROGRAM

## 2024-05-29 PROCEDURE — 96375 TX/PRO/DX INJ NEW DRUG ADDON: CPT

## 2024-05-29 PROCEDURE — 82962 GLUCOSE BLOOD TEST: CPT

## 2024-05-29 PROCEDURE — A4216 STERILE WATER/SALINE, 10 ML: HCPCS | Performed by: EMERGENCY MEDICINE

## 2024-05-29 PROCEDURE — 2580000003 HC RX 258: Performed by: EMERGENCY MEDICINE

## 2024-05-29 PROCEDURE — 04L73DZ OCCLUSION OF LEFT COLIC ARTERY WITH INTRALUMINAL DEVICE, PERCUTANEOUS APPROACH: ICD-10-PCS | Performed by: STUDENT IN AN ORGANIZED HEALTH CARE EDUCATION/TRAINING PROGRAM

## 2024-05-29 PROCEDURE — 6370000000 HC RX 637 (ALT 250 FOR IP)

## 2024-05-29 PROCEDURE — 96374 THER/PROPH/DIAG INJ IV PUSH: CPT

## 2024-05-29 PROCEDURE — 85025 COMPLETE CBC W/AUTO DIFF WBC: CPT

## 2024-05-29 PROCEDURE — 6360000002 HC RX W HCPCS: Performed by: STUDENT IN AN ORGANIZED HEALTH CARE EDUCATION/TRAINING PROGRAM

## 2024-05-29 RX ORDER — FENTANYL CITRATE 50 UG/ML
INJECTION, SOLUTION INTRAMUSCULAR; INTRAVENOUS PRN
Status: COMPLETED | OUTPATIENT
Start: 2024-05-29 | End: 2024-05-29

## 2024-05-29 RX ORDER — 0.9 % SODIUM CHLORIDE 0.9 %
1000 INTRAVENOUS SOLUTION INTRAVENOUS ONCE
Status: COMPLETED | OUTPATIENT
Start: 2024-05-29 | End: 2024-05-29

## 2024-05-29 RX ORDER — LISINOPRIL 20 MG/1
40 TABLET ORAL DAILY
Status: DISCONTINUED | OUTPATIENT
Start: 2024-05-29 | End: 2024-05-31 | Stop reason: HOSPADM

## 2024-05-29 RX ORDER — ACETAMINOPHEN 325 MG/1
650 TABLET ORAL
Status: COMPLETED | OUTPATIENT
Start: 2024-05-29 | End: 2024-05-29

## 2024-05-29 RX ORDER — ACETAMINOPHEN 325 MG/1
650 TABLET ORAL EVERY 6 HOURS PRN
Status: DISCONTINUED | OUTPATIENT
Start: 2024-05-29 | End: 2024-05-31 | Stop reason: HOSPADM

## 2024-05-29 RX ORDER — HYDROCODONE BITARTRATE AND ACETAMINOPHEN 5; 325 MG/1; MG/1
1 TABLET ORAL
Status: COMPLETED | OUTPATIENT
Start: 2024-05-29 | End: 2024-05-29

## 2024-05-29 RX ORDER — TRAZODONE HYDROCHLORIDE 50 MG/1
50 TABLET ORAL NIGHTLY
Status: DISCONTINUED | OUTPATIENT
Start: 2024-05-29 | End: 2024-05-29

## 2024-05-29 RX ORDER — DULOXETIN HYDROCHLORIDE 30 MG/1
120 CAPSULE, DELAYED RELEASE ORAL DAILY
Status: DISCONTINUED | OUTPATIENT
Start: 2024-05-29 | End: 2024-05-31 | Stop reason: HOSPADM

## 2024-05-29 RX ORDER — DEXTROSE MONOHYDRATE 100 MG/ML
INJECTION, SOLUTION INTRAVENOUS CONTINUOUS PRN
Status: DISCONTINUED | OUTPATIENT
Start: 2024-05-29 | End: 2024-05-31 | Stop reason: HOSPADM

## 2024-05-29 RX ORDER — GABAPENTIN 300 MG/1
1200 CAPSULE ORAL 3 TIMES DAILY
Status: DISCONTINUED | OUTPATIENT
Start: 2024-05-29 | End: 2024-05-29

## 2024-05-29 RX ORDER — ONDANSETRON 4 MG/1
4 TABLET, ORALLY DISINTEGRATING ORAL EVERY 8 HOURS PRN
Status: DISCONTINUED | OUTPATIENT
Start: 2024-05-29 | End: 2024-05-31 | Stop reason: HOSPADM

## 2024-05-29 RX ORDER — ACETAMINOPHEN 650 MG/1
650 SUPPOSITORY RECTAL EVERY 6 HOURS PRN
Status: DISCONTINUED | OUTPATIENT
Start: 2024-05-29 | End: 2024-05-31 | Stop reason: HOSPADM

## 2024-05-29 RX ORDER — SODIUM CHLORIDE 9 MG/ML
INJECTION, SOLUTION INTRAVENOUS PRN
Status: DISCONTINUED | OUTPATIENT
Start: 2024-05-29 | End: 2024-05-31 | Stop reason: HOSPADM

## 2024-05-29 RX ORDER — ONDANSETRON 2 MG/ML
4 INJECTION INTRAMUSCULAR; INTRAVENOUS EVERY 6 HOURS PRN
Status: DISCONTINUED | OUTPATIENT
Start: 2024-05-29 | End: 2024-05-31 | Stop reason: HOSPADM

## 2024-05-29 RX ORDER — POLYETHYLENE GLYCOL 3350 17 G/17G
17 POWDER, FOR SOLUTION ORAL DAILY PRN
Status: DISCONTINUED | OUTPATIENT
Start: 2024-05-29 | End: 2024-05-31 | Stop reason: HOSPADM

## 2024-05-29 RX ORDER — INSULIN LISPRO 100 [IU]/ML
0-4 INJECTION, SOLUTION INTRAVENOUS; SUBCUTANEOUS NIGHTLY
Status: DISCONTINUED | OUTPATIENT
Start: 2024-05-29 | End: 2024-05-31 | Stop reason: HOSPADM

## 2024-05-29 RX ORDER — AMLODIPINE BESYLATE 5 MG/1
10 TABLET ORAL DAILY
Status: DISCONTINUED | OUTPATIENT
Start: 2024-05-30 | End: 2024-05-31 | Stop reason: HOSPADM

## 2024-05-29 RX ORDER — GLUCAGON 1 MG
1 KIT INJECTION PRN
Status: DISCONTINUED | OUTPATIENT
Start: 2024-05-29 | End: 2024-05-29

## 2024-05-29 RX ORDER — INSULIN LISPRO 100 [IU]/ML
0-8 INJECTION, SOLUTION INTRAVENOUS; SUBCUTANEOUS
Status: DISCONTINUED | OUTPATIENT
Start: 2024-05-29 | End: 2024-05-31 | Stop reason: HOSPADM

## 2024-05-29 RX ORDER — SODIUM CHLORIDE 0.9 % (FLUSH) 0.9 %
5-40 SYRINGE (ML) INJECTION PRN
Status: DISCONTINUED | OUTPATIENT
Start: 2024-05-29 | End: 2024-05-31 | Stop reason: HOSPADM

## 2024-05-29 RX ORDER — ROSUVASTATIN CALCIUM 10 MG/1
20 TABLET, COATED ORAL DAILY
Status: DISCONTINUED | OUTPATIENT
Start: 2024-05-29 | End: 2024-05-31 | Stop reason: HOSPADM

## 2024-05-29 RX ORDER — NICOTINE 21 MG/24HR
1 PATCH, TRANSDERMAL 24 HOURS TRANSDERMAL EVERY 24 HOURS
Status: DISCONTINUED | OUTPATIENT
Start: 2024-05-29 | End: 2024-05-31 | Stop reason: HOSPADM

## 2024-05-29 RX ORDER — LANOLIN ALCOHOL/MO/W.PET/CERES
3 CREAM (GRAM) TOPICAL NIGHTLY PRN
Status: DISCONTINUED | OUTPATIENT
Start: 2024-05-29 | End: 2024-05-31 | Stop reason: HOSPADM

## 2024-05-29 RX ORDER — TRAZODONE HYDROCHLORIDE 50 MG/1
200 TABLET ORAL NIGHTLY
Status: DISCONTINUED | OUTPATIENT
Start: 2024-05-29 | End: 2024-05-31 | Stop reason: HOSPADM

## 2024-05-29 RX ORDER — OXYBUTYNIN CHLORIDE 5 MG/1
10 TABLET, EXTENDED RELEASE ORAL DAILY
Status: DISCONTINUED | OUTPATIENT
Start: 2024-05-30 | End: 2024-05-31 | Stop reason: HOSPADM

## 2024-05-29 RX ORDER — GABAPENTIN 300 MG/1
1200 CAPSULE ORAL 3 TIMES DAILY
Status: DISCONTINUED | OUTPATIENT
Start: 2024-05-29 | End: 2024-05-31 | Stop reason: HOSPADM

## 2024-05-29 RX ORDER — INSULIN GLARGINE 100 [IU]/ML
20 INJECTION, SOLUTION SUBCUTANEOUS NIGHTLY
Status: DISCONTINUED | OUTPATIENT
Start: 2024-05-29 | End: 2024-05-31

## 2024-05-29 RX ORDER — MORPHINE SULFATE 4 MG/ML
4 INJECTION, SOLUTION INTRAMUSCULAR; INTRAVENOUS
Status: COMPLETED | OUTPATIENT
Start: 2024-05-29 | End: 2024-05-29

## 2024-05-29 RX ORDER — MORPHINE SULFATE 4 MG/ML
4 INJECTION, SOLUTION INTRAMUSCULAR; INTRAVENOUS EVERY 8 HOURS PRN
Status: DISCONTINUED | OUTPATIENT
Start: 2024-05-29 | End: 2024-05-30

## 2024-05-29 RX ORDER — SODIUM CHLORIDE 0.9 % (FLUSH) 0.9 %
5-40 SYRINGE (ML) INJECTION EVERY 12 HOURS SCHEDULED
Status: DISCONTINUED | OUTPATIENT
Start: 2024-05-29 | End: 2024-05-31 | Stop reason: HOSPADM

## 2024-05-29 RX ORDER — LIDOCAINE HYDROCHLORIDE 10 MG/ML
INJECTION, SOLUTION EPIDURAL; INFILTRATION; INTRACAUDAL; PERINEURAL PRN
Status: COMPLETED | OUTPATIENT
Start: 2024-05-29 | End: 2024-05-29

## 2024-05-29 RX ADMIN — GABAPENTIN 1200 MG: 300 CAPSULE ORAL at 22:04

## 2024-05-29 RX ADMIN — IOPAMIDOL 60 ML: 612 INJECTION, SOLUTION INTRAVENOUS at 16:00

## 2024-05-29 RX ADMIN — LIDOCAINE HYDROCHLORIDE 10 ML: 10 INJECTION, SOLUTION EPIDURAL; INFILTRATION; INTRACAUDAL; PERINEURAL at 15:11

## 2024-05-29 RX ADMIN — FENTANYL CITRATE 50 MCG: 50 INJECTION, SOLUTION INTRAMUSCULAR; INTRAVENOUS at 16:00

## 2024-05-29 RX ADMIN — FENTANYL CITRATE 50 MCG: 50 INJECTION, SOLUTION INTRAMUSCULAR; INTRAVENOUS at 15:15

## 2024-05-29 RX ADMIN — ACETAMINOPHEN 650 MG: 325 TABLET ORAL at 11:45

## 2024-05-29 RX ADMIN — TRAZODONE HYDROCHLORIDE 200 MG: 50 TABLET ORAL at 22:12

## 2024-05-29 RX ADMIN — FENTANYL CITRATE 50 MCG: 50 INJECTION, SOLUTION INTRAMUSCULAR; INTRAVENOUS at 15:08

## 2024-05-29 RX ADMIN — HYDROCODONE BITARTRATE AND ACETAMINOPHEN 1 TABLET: 5; 325 TABLET ORAL at 11:44

## 2024-05-29 RX ADMIN — MORPHINE SULFATE 4 MG: 4 INJECTION INTRAVENOUS at 10:32

## 2024-05-29 RX ADMIN — IOPAMIDOL 100 ML: 755 INJECTION, SOLUTION INTRAVENOUS at 12:13

## 2024-05-29 RX ADMIN — PANTOPRAZOLE SODIUM 40 MG: 40 INJECTION, POWDER, FOR SOLUTION INTRAVENOUS at 10:28

## 2024-05-29 RX ADMIN — FENTANYL CITRATE 50 MCG: 50 INJECTION, SOLUTION INTRAMUSCULAR; INTRAVENOUS at 16:12

## 2024-05-29 RX ADMIN — SODIUM CHLORIDE 1000 ML: 9 INJECTION, SOLUTION INTRAVENOUS at 10:12

## 2024-05-29 ASSESSMENT — ENCOUNTER SYMPTOMS
SHORTNESS OF BREATH: 0
BACK PAIN: 1
EYE PAIN: 0
COUGH: 0
EYE REDNESS: 0
RHINORRHEA: 0
NAUSEA: 1
VOMITING: 0
BLOOD IN STOOL: 1
ABDOMINAL PAIN: 1

## 2024-05-29 ASSESSMENT — PAIN SCALES - GENERAL
PAINLEVEL_OUTOF10: 3
PAINLEVEL_OUTOF10: 5
PAINLEVEL_OUTOF10: 6

## 2024-05-29 ASSESSMENT — PAIN DESCRIPTION - DESCRIPTORS
DESCRIPTORS: ACHING
DESCRIPTORS: ACHING
DESCRIPTORS: DULL

## 2024-05-29 ASSESSMENT — PAIN DESCRIPTION - LOCATION
LOCATION: ABDOMEN

## 2024-05-29 ASSESSMENT — PAIN DESCRIPTION - ORIENTATION
ORIENTATION: RIGHT;LOWER
ORIENTATION: RIGHT
ORIENTATION: RIGHT;LEFT;LOWER

## 2024-05-29 NOTE — PROGRESS NOTES
4:30 PM  TRANSFER - IN REPORT:    Verbal report received from Kim fields Deric Mabry  being received from IR for routine post-op      Report consisted of patient's Situation, Background, Assessment and   Recommendations(SBAR).     Information from the following report(s) Nurse Handoff Report was reviewed with the receiving nurse.    Opportunity for questions and clarification was provided.      Assessment completed upon patient's arrival to unit and care assumed.     6:04 PM  Bedside shift report given to CHRISTOPHER Baez. Right groin clean, dry, and intact with no bleeding or hematoma noted.

## 2024-05-29 NOTE — CONSULTS
Radiology History and Physical    Patient: Deric Mabry 74 y.o. male       Chief Complaint: Rectal Bleeding    History of Present Illness: Mr. Mabry is a 74 y.o. man with COPD, DM2, HTN, HLD, and diverticulosis s/p sigmoidectomy who presents with BRBPR and CTA positive for active bleeding in the descending colon. HD stable. Hgb 17 ->12 ->11. He continues to have bloody bowel movements.     History:    Past Medical History:   Diagnosis Date    Adverse effect of anesthesia     kept pt in ICU longer after colon surgery d/t smoking    Arthritis     psoriatic    Chronic obstructive pulmonary disease (HCC)     Chronic pain     neck/back/legs/knees especially right/ankles/right hand/fingers    Diabetes (HCC)     type 1    Hypertension     Ill-defined condition     slight rotator cuff tears on both shoulders/injection in left shoulder    Ill-defined condition     DDD in back and neck - sciatic pain - hx endoscopic injections in back    Ill-defined condition     peripheral neuropathy    Ill-defined condition     increased cholesterol    Rheumatic fever     Thyroid disease     \"a little bit high\"/improving when subsequent blood tests - monitoring     Family History   Problem Relation Age of Onset    Other Father         arthritis/colon resection d/t \"being clogged\"/kidney stone    Cancer Paternal Uncle         lung    Heart Disease Mother     Heart Disease Maternal Uncle     Heart Disease Maternal Uncle     Heart Disease Maternal Aunt     Other Maternal Aunt         shingles    Heart Disease Father     Stroke Mother         x3    Other Mother         DDD/arthritis     Social History     Socioeconomic History    Marital status:      Spouse name: Not on file    Number of children: Not on file    Years of education: Not on file    Highest education level: Not on file   Occupational History    Not on file   Tobacco Use    Smoking status: Former     Average packs/day: 1.5 packs/day for 55.4 years (82.9 ttl pk-yrs)

## 2024-05-29 NOTE — PROCEDURES
Brief Postoperative Note    Deric Mabry  YOB: 1950  [id number]    Pre-operative Diagnosis: LGIB    Post-operative Diagnosis: Same    Procedure: Angiogram and embolization     Anesthesia: Local     Surgeons/Assistants: Ev Haywood MD    Estimated Blood Loss: Minimal     Complications: None    Specimens: Was Not Obtained    Findings:   MARQUEZ and left colic artery angiograms demonstrated active bleeding into the distal descending colon.  Coil embolization was performed of the culprit vasa recta with two 1 mm x 2 cm Akiko LP coils. Final angiograms demonstrated no further bleeding.    Plan:  Flat for 2 hours.     Electronically signed by Ev Haywood MD on 5/29/2024 at 4:25 PM

## 2024-05-29 NOTE — ED TRIAGE NOTES
Pt arrives to the ER for complaints of rectal bleeding that started last night and reports that he has been bleeding all night.     Pt also reports sharp abdominal pain.

## 2024-05-29 NOTE — TELEPHONE ENCOUNTER
Jalen Leigh,    Sending to you as an FYI    Patient called and stated that he has diarrhea that is bloody. I asked the patient was he in any pain he stated that he has pain on his side. After speaking with the triage nurse Kota he stated that he recommends he go the emergency room. Patient was hesitant on going stating that he would just like to come in clinic because he would be at the emergency room all day. I asked patient was he bleeding he stated yes, I asked was it a lot of blood he stated yes but its not constantly bleeding only bleeds when he uses the bathroom. I informed patient due to the symptoms it is highly recommended that he goes to the emergency room. Patient agreed to go.

## 2024-05-29 NOTE — PROGRESS NOTES
1900  Pt had bowel movement.  Noted it had blood in it per patient.  Pt states that it looked like old blood, not bright red, more maroon.  No blood on chucks pad.  Right groin site is clean dry and intact.      8:08 PM  Bedside report given to nightshift nurse.  Patient ambulated to bed from stretcher.  Groin looks clean dry and intact.  Messaged MD to unhold all meds so he may take.

## 2024-05-29 NOTE — CONSULTS
Linh Faith PA-C                       [phone] office             Monday-Friday 8:00 am-4:30 pm  I am not permitted to use \"perfect serve\" use above for contact, thanks.      Gastroenterology Consultation Note      Admit Date: 5/29/2024  Consult Date: 5/29/2024   I greatly appreciate your asking me to see Deric Mabry, thank you very much for the opportunity to participate in his care.    Narrative Assessment and Plan   74 year old male presenting to the ED wit several episodes of hematochezia over the past day. /59, HR 61, Hgb 12.4. CTA with active diverticular bleeding at descending/sigmoid colon junction. CTA also shows CBD dilation to 1.3 cm, tiny nonobstructing densities in CBD most likely representing choledocholithiasis.     Impression:  Diverticular bleeding  Biliary dilation on CT    Plan:  IR consulted for embolization of the bleed, procedure planned for later today. Plan discussed with ED provider.   If the IR embolization is unsuccessful, we will plan to prep tomorrow for colonoscopy on Friday.   Continue to monitor H&H, transfuse as needed.   CBD dilation - LFTs are within normal limits at this time. No upper abdominal pain, N/V, or leukocytosis suggesting infection. We can consider MRCP for further evaluation once GI bleeding has been stabilized.   Linh Faith PA-C    Subjective:     Chief Complaint: Gastrointestinal Bleeding    History of Present Illness: Patient is a 74 y.o. male with a history of type 1 diabetes, psoriatic arthritis, hypertension and chronic NSAID use admitted on 5/29/2024 with several episodes of hematochezia over the past day. Patient reports associated crampy, lower abdominal pain and lightheadedness, but denies any hematemesis, melena, or weight loss.  Patient denies any prior similar episodes. He takes celebrex and aspirin 81mg daily and diclofenac as needed for pain. History  throacic deformity  GI:  Abdomen nondistended, nontender, no mass, no free fluid, no rebound or guarding.  Musculoskeletal:  No skeletal deformity nor acute arthritis noted.  Neurological:  Motor and sensory function intact in upper extremeties  Psychiatric:  Normal affect, memory intact, appears to have insight into current illness    Laboratory:    Recent Results (from the past 24 hour(s))   CBC with Auto Differential    Collection Time: 05/29/24 10:02 AM   Result Value Ref Range    WBC 6.8 4.1 - 11.1 K/uL    RBC 3.60 (L) 4.10 - 5.70 M/uL    Hemoglobin 12.4 12.1 - 17.0 g/dL    Hematocrit 35.5 (L) 36.6 - 50.3 %    MCV 98.6 80.0 - 99.0 FL    MCH 34.4 (H) 26.0 - 34.0 PG    MCHC 34.9 30.0 - 36.5 g/dL    RDW 12.7 11.5 - 14.5 %    Platelets 229 150 - 400 K/uL    MPV 10.5 8.9 - 12.9 FL    Nucleated RBCs 0.0 0  WBC    nRBC 0.00 0.00 - 0.01 K/uL    Neutrophils % 59 32 - 75 %    Lymphocytes % 23 12 - 49 %    Monocytes % 8 5 - 13 %    Eosinophils % 9 (H) 0 - 7 %    Basophils % 1 0 - 1 %    Immature Granulocytes % 0 0.0 - 0.5 %    Neutrophils Absolute 4.1 1.8 - 8.0 K/UL    Lymphocytes Absolute 1.6 0.8 - 3.5 K/UL    Monocytes Absolute 0.5 0.0 - 1.0 K/UL    Eosinophils Absolute 0.6 (H) 0.0 - 0.4 K/UL    Basophils Absolute 0.1 0.0 - 0.1 K/UL    Immature Granulocytes Absolute 0.0 0.00 - 0.04 K/UL    Differential Type AUTOMATED     CMP    Collection Time: 05/29/24 10:02 AM   Result Value Ref Range    Sodium 139 136 - 145 mmol/L    Potassium 4.0 3.5 - 5.1 mmol/L    Chloride 109 (H) 97 - 108 mmol/L    CO2 28 21 - 32 mmol/L    Anion Gap 2 (L) 5 - 15 mmol/L    Glucose 208 (H) 65 - 100 mg/dL    BUN 26 (H) 6 - 20 MG/DL    Creatinine 0.88 0.70 - 1.30 MG/DL    BUN/Creatinine Ratio 30 (H) 12 - 20      Est, Glom Filt Rate >90 >60 ml/min/1.73m2    Calcium 8.2 (L) 8.5 - 10.1 MG/DL    Total Bilirubin 0.6 0.2 - 1.0 MG/DL    ALT 29 12 - 78 U/L    AST 22 15 - 37 U/L    Alk Phosphatase 80 45 - 117 U/L    Total Protein 5.8 (L) 6.4 - 8.2 g/dL

## 2024-05-29 NOTE — H&P
[address] [id number]   Office [phone]  Fax [phone]       Admission H&P     Name: Deric Mabry MRN: [id number]  Sex: Male   YOB: 1950  Age: 74 y.o.  PCP: Joby Leigh MD     Source of Information: patient, medical records    Chief complaint: rectal bleeding    History of Present Illness  Deric Mabry is a 74 y.o. male with known history of T1DM, psoriatic arthritis, hypertension, HLD, osteoarthritis, small bowel obstruction status post partial colectomy 10 years ago, chronic pain, depression, tobacco use who presents to the ER complaining of shoulder persistent bright red blood per rectum acute onset last night with associated cramping abdominal pain predominantly in the right lower quadrant.  On admission to the ER patient noted to have drop in hemoglobin over 17-11 with persistent bleeding and CTA revealed evidence of bleed.  Patient emergently taken for embolization with IR.  Patient also evaluated by GI.  Currently patient reports that pain is controlled.  Does note history of significant NSAID use, currently has prescription for celecoxib for chronic arthritic pain.  Also takes aspirin daily as well as Goody powders intermittently for headaches at home.  Has never experienced any prior episodes of bleeding.    In the ER:      Vitals:  Patient Vitals for the past 8 hrs:   Pulse Resp BP SpO2   05/29/24 1800 56 15 (!) 122/53 92 %   05/29/24 1745 52 15 112/68 93 %   05/29/24 1730 50 13 136/64 99 %   05/29/24 1715 56 11 127/63 97 %   05/29/24 1700 52 16 134/62 99 %   05/29/24 1645 (!) 48 -- 131/62 99 %   05/29/24 1635 (!) 44 17 (!) 129/54 92 %   05/29/24 1630 51 16 125/66 95 %   05/29/24 1508 -- -- -- 100 %   05/29/24 1345 61 20 (!) 124/59 96 %   05/29/24 1257 53 20 (!) 125/56 98 %         Labs:   Recent Labs     05/29/24  1002 05/29/24  1410   WBC 6.8  --    HGB 12.4 11.0*   HCT 35.5* 31.2*     --      Recent Labs     05/29/24  1002     Chronic pain     neck/back/legs/knees especially right/ankles/right hand/fingers    Diabetes (HCC)     type 1    Hypertension     Ill-defined condition     slight rotator cuff tears on both shoulders/injection in left shoulder    Ill-defined condition     DDD in back and neck - sciatic pain - hx endoscopic injections in back    Ill-defined condition     peripheral neuropathy    Ill-defined condition     increased cholesterol    Rheumatic fever     Thyroid disease     \"a little bit high\"/improving when subsequent blood tests - monitoring       Past Surgical History:   Procedure Laterality Date    CARDIAC PROCEDURE N/A 9/20/2023    Left heart cath / coronary angiography performed by Clifford Jin MD at [facility] CARDIAC CATH LAB    CHOLECYSTECTOMY      COLONOSCOPY N/A 12/22/2017    COLONOSCOPY performed by Joaquín Brumfield MD at [facility] ENDOSCOPY    COLONOSCOPY N/A 7/26/2017    COLONOSCOPY performed by Joaquín Brumfield MD at [facility] ENDOSCOPY    GI  2012    6 inches colon removed - d/t blockage    GI      colonoscopy - 5/2017 - polyps    ORTHOPEDIC SURGERY      surgery for left wrist fx after it healed d/t pt not being able to move it - has hardware       Family History   Problem Relation Age of Onset    Other Father         arthritis/colon resection d/t \"being clogged\"/kidney stone    Cancer Paternal Uncle         lung    Heart Disease Mother     Heart Disease Maternal Uncle     Heart Disease Maternal Uncle     Heart Disease Maternal Aunt     Other Maternal Aunt         shingles    Heart Disease Father     Stroke Mother         x3    Other Mother         DDD/arthritis       Social History  Social History     Socioeconomic History    Marital status:      Spouse name: Not on file    Number of children: Not on file    Years of education: Not on file    Highest education level: Not on file   Occupational History    Not on file   Tobacco Use    Smoking status: Former     Average packs/day: 1.5 packs/day for 55.4 years (82.9

## 2024-05-29 NOTE — ED PROVIDER NOTES
Difficulty of Paying Living Expenses: Not very hard   Food Insecurity: No Food Insecurity (1/4/2024)    Hunger Vital Sign     Worried About Running Out of Food in the Last Year: Never true     Ran Out of Food in the Last Year: Never true   Transportation Needs: Unknown (1/4/2024)    PRAPARE - Transportation     Lack of Transportation (Non-Medical): No   Physical Activity: Unknown (1/2/2024)    Exercise Vital Sign     Days of Exercise per Week: 0 days   Housing Stability: Unknown (1/4/2024)    Housing Stability Vital Sign     Unstable Housing in the Last Year: No           PHYSICAL EXAM    (up to 7 for level 4, 8 or more for level 5)     ED Triage Vitals   BP Temp Temp src Pulse Resp SpO2 Height Weight   -- -- -- -- -- -- -- --       There is no height or weight on file to calculate BMI.    Physical Exam  Vitals and nursing note reviewed.   Constitutional:       General: He is not in acute distress.     Appearance: Normal appearance. He is not ill-appearing, toxic-appearing or diaphoretic.   HENT:      Head: Normocephalic and atraumatic.   Cardiovascular:      Rate and Rhythm: Normal rate.      Pulses: Normal pulses.   Pulmonary:      Effort: Pulmonary effort is normal. No respiratory distress.   Abdominal:      General: There is no distension.      Palpations: There is no mass.      Tenderness: There is abdominal tenderness in the right lower quadrant. There is no guarding or rebound. Negative signs include Bhagat's sign.   Musculoskeletal:         General: No swelling, deformity or signs of injury.   Skin:     General: Skin is warm and dry.      Capillary Refill: Capillary refill takes less than 2 seconds.      Coloration: Skin is not jaundiced or pale.   Neurological:      General: No focal deficit present.      Mental Status: He is alert and oriented to person, place, and time. Mental status is at baseline.         DIAGNOSTIC RESULTS     EKG:   All EKG's are interpreted by an Emergency Department Physician who  05/29/24 1000 05/29/24 1257 05/29/24 1345   BP: 100/76 (!) 125/56 (!) 124/59   Pulse: 57 53 61   Resp: 18 20 20   Temp: 97.3 °F (36.3 °C)     TempSrc: Oral     SpO2: 99% 98% 96%           Medical Decision Making  Amount and/or Complexity of Data Reviewed  Labs: ordered. Decision-making details documented in ED Course.  Radiology: ordered.    Risk  OTC drugs.  Prescription drug management.  Decision regarding hospitalization.        ED Course as of 05/29/24 1400   Wed May 29, 2024   1015 POC Glucose(!): 195 [RS]   1026 Hemoglobin Quant: 12.4 [RS]   1026 Platelet Count: 229 [RS]   1057 Troponin, High Sensitivity: 10 [RS]   1057 BUN,BUNPL(!): 26 [RS]   1057 Creatinine: 0.88 [RS]   1133 Patient is resting comfortably on reevaluation, abdominal pain improved with morphine.  He reports he has chronic pain to back and neck, which is flaring.  He has been on NSAID for this, will defer this given GI bleed today.  Lortab ordered. [RS]   1331 CT scan discussed with radiology, active diverticular bleeding noted in right lower quadrant.  Incidental calcification to biliary duct [RS]   1353 Discussed CTA results with pt and wife. He has had BM as recently as 30 mins ago. Awake and conversant, well perfused. Will recheck Hgb.    GI PA at bedside, recommends IR eval. [RS]   1358 D/w Dr. Haywood, IR, plan for angiogram +/- embolization [RS]      ED Course User Index  [RS] Malik Kaur MD           CONSULTS:  IP CONSULT TO GI    PROCEDURES:  Unless otherwise noted below, none     Procedures    MEDS:  Medications   pantoprazole (PROTONIX) 40 mg in sodium chloride (PF) 0.9 % 10 mL injection (40 mg IntraVENous Given 5/29/24 1028)   sodium chloride 0.9 % bolus 1,000 mL (1,000 mLs IntraVENous New Bag 5/29/24 1012)   morphine sulfate (PF) injection 4 mg (4 mg IntraVENous Given 5/29/24 1032)   HYDROcodone-acetaminophen (NORCO) 5-325 MG per tablet 1 tablet (1 tablet Oral Given 5/29/24 1144)   acetaminophen (TYLENOL) tablet 650 mg (650 mg

## 2024-05-29 NOTE — CONSENT
Informed Consent for Blood Component Transfusion Note    I have discussed with the patient the rationale for blood component transfusion; its benefits in treating or preventing fatigue, organ damage, or death; and its risk which includes mild transfusion reactions, rare risk of blood borne infection, or more serious but rare reactions. I have discussed the alternatives to transfusion, including the risk and consequences of not receiving transfusion. The patient had an opportunity to ask questions and had agreed to proceed with transfusion of blood components.    Electronically signed by Kerri Sky MD on 5/29/24 at 7:09 PM EDT

## 2024-05-30 ENCOUNTER — APPOINTMENT (OUTPATIENT)
Facility: HOSPITAL | Age: 74
DRG: 357 | End: 2024-05-30
Payer: MEDICARE

## 2024-05-30 LAB
ALBUMIN SERPL-MCNC: 2.5 G/DL (ref 3.5–5)
ALBUMIN/GLOB SERPL: 1.2 (ref 1.1–2.2)
ALP SERPL-CCNC: 108 U/L (ref 45–117)
ALT SERPL-CCNC: 132 U/L (ref 12–78)
ANION GAP SERPL CALC-SCNC: 2 MMOL/L (ref 5–15)
AST SERPL-CCNC: 119 U/L (ref 15–37)
BASOPHILS # BLD: 0 K/UL (ref 0–0.1)
BASOPHILS NFR BLD: 0 % (ref 0–1)
BILIRUB SERPL-MCNC: 0.4 MG/DL (ref 0.2–1)
BUN SERPL-MCNC: 21 MG/DL (ref 6–20)
BUN/CREAT SERPL: 28 (ref 12–20)
CALCIUM SERPL-MCNC: 7.5 MG/DL (ref 8.5–10.1)
CHLORIDE SERPL-SCNC: 113 MMOL/L (ref 97–108)
CO2 SERPL-SCNC: 28 MMOL/L (ref 21–32)
COMMENT:: NORMAL
CREAT SERPL-MCNC: 0.76 MG/DL (ref 0.7–1.3)
DIFFERENTIAL METHOD BLD: ABNORMAL
EOSINOPHIL # BLD: 0.3 K/UL (ref 0–0.4)
EOSINOPHIL NFR BLD: 4 % (ref 0–7)
ERYTHROCYTE [DISTWIDTH] IN BLOOD BY AUTOMATED COUNT: 12.8 % (ref 11.5–14.5)
GLOBULIN SER CALC-MCNC: 2.1 G/DL (ref 2–4)
GLUCOSE BLD STRIP.AUTO-MCNC: 163 MG/DL (ref 65–117)
GLUCOSE BLD STRIP.AUTO-MCNC: 178 MG/DL (ref 65–117)
GLUCOSE BLD STRIP.AUTO-MCNC: 179 MG/DL (ref 65–117)
GLUCOSE BLD STRIP.AUTO-MCNC: 75 MG/DL (ref 65–117)
GLUCOSE BLD STRIP.AUTO-MCNC: 86 MG/DL (ref 65–117)
GLUCOSE SERPL-MCNC: 204 MG/DL (ref 65–100)
HCT VFR BLD AUTO: 25.7 % (ref 36.6–50.3)
HCT VFR BLD AUTO: 25.8 % (ref 36.6–50.3)
HCT VFR BLD AUTO: 27.1 % (ref 36.6–50.3)
HCT VFR BLD AUTO: 27.4 % (ref 36.6–50.3)
HGB BLD-MCNC: 8.9 G/DL (ref 12.1–17)
HGB BLD-MCNC: 9 G/DL (ref 12.1–17)
HGB BLD-MCNC: 9.4 G/DL (ref 12.1–17)
HGB BLD-MCNC: 9.5 G/DL (ref 12.1–17)
IMM GRANULOCYTES # BLD AUTO: 0 K/UL (ref 0–0.04)
IMM GRANULOCYTES NFR BLD AUTO: 0 % (ref 0–0.5)
LYMPHOCYTES # BLD: 1.1 K/UL (ref 0.8–3.5)
LYMPHOCYTES NFR BLD: 15 % (ref 12–49)
MCH RBC QN AUTO: 34.4 PG (ref 26–34)
MCHC RBC AUTO-ENTMCNC: 34.3 G/DL (ref 30–36.5)
MCV RBC AUTO: 100.4 FL (ref 80–99)
MONOCYTES # BLD: 0.5 K/UL (ref 0–1)
MONOCYTES NFR BLD: 6 % (ref 5–13)
NEUTS SEG # BLD: 5.4 K/UL (ref 1.8–8)
NEUTS SEG NFR BLD: 75 % (ref 32–75)
NRBC # BLD: 0 K/UL (ref 0–0.01)
NRBC BLD-RTO: 0 PER 100 WBC
PLATELET # BLD AUTO: 155 K/UL (ref 150–400)
PMV BLD AUTO: 10.1 FL (ref 8.9–12.9)
POTASSIUM SERPL-SCNC: 3.6 MMOL/L (ref 3.5–5.1)
PROT SERPL-MCNC: 4.6 G/DL (ref 6.4–8.2)
RBC # BLD AUTO: 2.73 M/UL (ref 4.1–5.7)
SERVICE CMNT-IMP: ABNORMAL
SERVICE CMNT-IMP: NORMAL
SERVICE CMNT-IMP: NORMAL
SODIUM SERPL-SCNC: 143 MMOL/L (ref 136–145)
SPECIMEN HOLD: NORMAL
WBC # BLD AUTO: 7.3 K/UL (ref 4.1–11.1)

## 2024-05-30 PROCEDURE — 82962 GLUCOSE BLOOD TEST: CPT

## 2024-05-30 PROCEDURE — 6370000000 HC RX 637 (ALT 250 FOR IP): Performed by: STUDENT IN AN ORGANIZED HEALTH CARE EDUCATION/TRAINING PROGRAM

## 2024-05-30 PROCEDURE — 36415 COLL VENOUS BLD VENIPUNCTURE: CPT

## 2024-05-30 PROCEDURE — 6370000000 HC RX 637 (ALT 250 FOR IP)

## 2024-05-30 PROCEDURE — 85018 HEMOGLOBIN: CPT

## 2024-05-30 PROCEDURE — 85014 HEMATOCRIT: CPT

## 2024-05-30 PROCEDURE — 2060000000 HC ICU INTERMEDIATE R&B

## 2024-05-30 PROCEDURE — 76705 ECHO EXAM OF ABDOMEN: CPT

## 2024-05-30 PROCEDURE — 6360000002 HC RX W HCPCS: Performed by: STUDENT IN AN ORGANIZED HEALTH CARE EDUCATION/TRAINING PROGRAM

## 2024-05-30 PROCEDURE — A4216 STERILE WATER/SALINE, 10 ML: HCPCS | Performed by: STUDENT IN AN ORGANIZED HEALTH CARE EDUCATION/TRAINING PROGRAM

## 2024-05-30 PROCEDURE — 80053 COMPREHEN METABOLIC PANEL: CPT

## 2024-05-30 PROCEDURE — 94761 N-INVAS EAR/PLS OXIMETRY MLT: CPT

## 2024-05-30 PROCEDURE — C9113 INJ PANTOPRAZOLE SODIUM, VIA: HCPCS | Performed by: STUDENT IN AN ORGANIZED HEALTH CARE EDUCATION/TRAINING PROGRAM

## 2024-05-30 PROCEDURE — 99223 1ST HOSP IP/OBS HIGH 75: CPT | Performed by: FAMILY MEDICINE

## 2024-05-30 PROCEDURE — 85025 COMPLETE CBC W/AUTO DIFF WBC: CPT

## 2024-05-30 PROCEDURE — 2580000003 HC RX 258: Performed by: STUDENT IN AN ORGANIZED HEALTH CARE EDUCATION/TRAINING PROGRAM

## 2024-05-30 RX ORDER — PETROLATUM,WHITE
OINTMENT IN PACKET (GRAM) TOPICAL 2 TIMES DAILY PRN
Status: DISCONTINUED | OUTPATIENT
Start: 2024-05-30 | End: 2024-05-31 | Stop reason: HOSPADM

## 2024-05-30 RX ORDER — CLOBETASOL PROPIONATE 0.5 MG/G
OINTMENT TOPICAL EVERY 12 HOURS
Status: DISCONTINUED | OUTPATIENT
Start: 2024-05-30 | End: 2024-05-31 | Stop reason: HOSPADM

## 2024-05-30 RX ORDER — OXYCODONE HYDROCHLORIDE 5 MG/1
5 TABLET ORAL EVERY 8 HOURS PRN
Status: DISCONTINUED | OUTPATIENT
Start: 2024-05-30 | End: 2024-05-31 | Stop reason: HOSPADM

## 2024-05-30 RX ORDER — SENNA AND DOCUSATE SODIUM 50; 8.6 MG/1; MG/1
2 TABLET, FILM COATED ORAL DAILY PRN
Status: DISCONTINUED | OUTPATIENT
Start: 2024-05-30 | End: 2024-05-31 | Stop reason: HOSPADM

## 2024-05-30 RX ORDER — 0.9 % SODIUM CHLORIDE 0.9 %
500 INTRAVENOUS SOLUTION INTRAVENOUS ONCE
Status: COMPLETED | OUTPATIENT
Start: 2024-05-31 | End: 2024-05-31

## 2024-05-30 RX ADMIN — OXYCODONE 5 MG: 5 TABLET ORAL at 20:05

## 2024-05-30 RX ADMIN — INSULIN GLARGINE 20 UNITS: 100 INJECTION, SOLUTION SUBCUTANEOUS at 00:09

## 2024-05-30 RX ADMIN — SODIUM CHLORIDE, PRESERVATIVE FREE 40 MG: 5 INJECTION INTRAVENOUS at 10:17

## 2024-05-30 RX ADMIN — SODIUM CHLORIDE, PRESERVATIVE FREE 10 ML: 5 INJECTION INTRAVENOUS at 21:16

## 2024-05-30 RX ADMIN — CLOBETASOL PROPIONATE: 0.5 OINTMENT TOPICAL at 21:16

## 2024-05-30 RX ADMIN — ROSUVASTATIN CALCIUM 20 MG: 10 TABLET, COATED ORAL at 07:53

## 2024-05-30 RX ADMIN — INSULIN GLARGINE 20 UNITS: 100 INJECTION, SOLUTION SUBCUTANEOUS at 21:08

## 2024-05-30 RX ADMIN — TRAZODONE HYDROCHLORIDE 200 MG: 50 TABLET ORAL at 21:08

## 2024-05-30 RX ADMIN — OXYBUTYNIN CHLORIDE 10 MG: 5 TABLET, EXTENDED RELEASE ORAL at 10:17

## 2024-05-30 RX ADMIN — GABAPENTIN 1200 MG: 300 CAPSULE ORAL at 07:53

## 2024-05-30 RX ADMIN — GABAPENTIN 1200 MG: 300 CAPSULE ORAL at 16:29

## 2024-05-30 RX ADMIN — GABAPENTIN 1200 MG: 300 CAPSULE ORAL at 21:08

## 2024-05-30 RX ADMIN — Medication 3 MG: at 00:09

## 2024-05-30 RX ADMIN — SODIUM CHLORIDE, PRESERVATIVE FREE 40 MG: 5 INJECTION INTRAVENOUS at 21:09

## 2024-05-30 RX ADMIN — OXYCODONE 5 MG: 5 TABLET ORAL at 10:22

## 2024-05-30 RX ADMIN — DULOXETINE HYDROCHLORIDE 120 MG: 30 CAPSULE, DELAYED RELEASE ORAL at 07:53

## 2024-05-30 RX ADMIN — SODIUM CHLORIDE, PRESERVATIVE FREE 40 MG: 5 INJECTION INTRAVENOUS at 00:08

## 2024-05-30 ASSESSMENT — PAIN DESCRIPTION - DESCRIPTORS: DESCRIPTORS: ACHING

## 2024-05-30 ASSESSMENT — PAIN SCALES - GENERAL
PAINLEVEL_OUTOF10: 6
PAINLEVEL_OUTOF10: 4
PAINLEVEL_OUTOF10: 7

## 2024-05-30 ASSESSMENT — PAIN DESCRIPTION - LOCATION
LOCATION: LEG;KNEE
LOCATION: BACK;GROIN

## 2024-05-30 ASSESSMENT — PAIN DESCRIPTION - ORIENTATION: ORIENTATION: RIGHT

## 2024-05-30 NOTE — PROGRESS NOTES
1930 Bedside shift change report given to Rae RN (oncoming nurse) by Oriana RN (offgoing nurse). Report included the following information SBAR, Intake/Output, MAR, Recent Results, and Cardiac Rhythm NSR .      0110 RRT for chest pain. EKG and trop obtained.     0700 Bedside shift change report given to Brittany RN (oncoming nurse) by Rae RN (offgoing nurse). Report included the following information SBAR, Intake/Output, MAR, Recent Results, and Cardiac Rhythm Sinus lizzette .

## 2024-05-30 NOTE — PROGRESS NOTES
Physician Progress Note      PATIENT:               ROSE JIM  CSN #:                  [id number]  :                       1950  ADMIT DATE:       2024 10:12 AM  DISCH DATE:  RESPONDING  PROVIDER #:        ESTRADA CHANDLER          QUERY TEXT:    Pt admitted with acute diverticular bleed. Pt noted to have H/H  9.4/,4 . If   possible, please document in the progress notes and discharge summary if you   are evaluating and/or treating any of the following:      The medical record reflects the following:  Risk Factors: acute diverticular bleed  Clinical Indicators:  24 14:10  Hemoglobin Quant: 11.0 (L)  Hematocrit: 31.2 (L)    24 00:18  Hemoglobin Quant: 9.4 (L)  Hematocrit: 27.4 (L)    H/P  Acute diverticular bleed: Acute onset in the last 24 hours with evidence of   acute bleeding on CTA imaging involving MARQUEZ and left colic artery as noted   above.    Treatment: TCXM RBC, H/H q6h, embolization by IR, protonix IV    Thank you  Sunitha Melgar RN, CDI, CCDS, CRCR  Certified  Clinical Documentation   O: [phone]  [email or web address]  I can also be reached by perfect serve  Options provided:  -- Acute blood loss anemia  -- Chronic blood loss anemia  -- Acute on chronic blood loss anemia  -- Other - I will add my own diagnosis  -- Disagree - Not applicable / Not valid  -- Disagree - Clinically unable to determine / Unknown  -- Refer to Clinical Documentation Reviewer    PROVIDER RESPONSE TEXT:    This patient has acute blood loss anemia.    Query created by: Sunitha Melgar on 2024 10:23 AM      Electronically signed by:  ESTRADA CHANDLER 2024 10:56 AM

## 2024-05-30 NOTE — PROGRESS NOTES
[address] [place] [address]   Office [phone]  Fax [phone]          Subjective / Objective     Subjective  Overnight Events: Was agitated d/t increasing pain overnight    Patient seen and examined at bedside. Reports feeling well this AM. Has some mild abdominal pain at insertion site. Denies CP, SOB, N/V, dysuria.    Respiratory:   Room air   Vitals:    05/30/24 0353   BP: (!) 90/57   Pulse: 55   Resp: 15   Temp: 98.1 °F (36.7 °C)   SpO2: 94%     Physical Examination:   General appearance - alert, well appearing, and in no distress  Chest - clear to auscultation, no wheezes, rales or rhonchi, symmetric air entry  Heart - normal rate, regular rhythm, normal S1, S2, no murmurs, rubs, clicks or gallops,   Abdomen - soft, mild RLQ tenderness at insertion site, dressing is clean and dry, nondistended, no masses or organomegaly  Neurological - alert, oriented, normal speech, no focal findings  Extremities - peripheral pulses normal, no pedal edema, no clubbing or cyanosis  Psychiatric - normal speech and thought processes    I/O:  05/29 0701 - 05/30 0700  In: -   Out: 450 [Urine:450]  Inpatient Medications  [email or web address]  Current Facility-Administered Medications   Medication Dose Route Frequency    sodium chloride flush 0.9 % injection 5-40 mL  5-40 mL IntraVENous 2 times per day    sodium chloride flush 0.9 % injection 5-40 mL  5-40 mL IntraVENous PRN    0.9 % sodium chloride infusion   IntraVENous PRN    ondansetron (ZOFRAN-ODT) disintegrating tablet 4 mg  4 mg Oral Q8H PRN    Or    ondansetron (ZOFRAN) injection 4 mg  4 mg IntraVENous Q6H PRN    polyethylene glycol (GLYCOLAX) packet 17 g  17 g Oral Daily PRN    acetaminophen (TYLENOL) tablet 650 mg  650 mg Oral Q6H PRN    Or    acetaminophen (TYLENOL) suppository 650 mg  650 mg Rectal Q6H PRN    insulin lispro (HUMALOG,ADMELOG) injection vial 0-8 Units  0-8 Units SubCUTAneous TID WC    insulin lispro (HUMALOG,ADMELOG) injection vial 0-4  SBO s/p partial colectomy: Approximately 10 years ago. No recurrent episodes and do not have prior records for this.      Hx tobacco use: Quit 1.5 months ago. On nicotine replacement.   - nicotine patch prn      Hx rheumatic fever: Last [facility] 9/20/23 - minimal, non-obstructive CAD.      FEN/GI - NPO.   Activity - Up as tolerated  DVT prophylaxis - Contraindicated in setting of GIB. SCDs   GI prophylaxis - Protonix  Fall prophylaxis - Not indicated at this time.  Disposition - Admit to Stepdown. Plan to d/c to Home.   Code Status - Full, discussed with patient / caregivers.  Next of Kin Name and Contact   Andressa Mabry (Spouse)  [phone] (Mobile)      Patient Deric Mabry will be discussed Dr. Tyra Orellana (Attending).   Miya Hutton MD  Family Medicine Resident       For Billing    Chief Complaint   Patient presents with    Rectal Bleeding

## 2024-05-30 NOTE — PROGRESS NOTES
Interventional Radiology Progress Note    5/30/2024    Subjective:    History: 73 yo male with admission for acute diverticular bleeding is 1 day s/p IR mesenteric angiography with catheter-directed coil embolization of vasa recta in the distal descending colon.     Today: Patient reports doing well. Reports mild tenderness in right groin and RLQ abdomen. Has tolerated po liquids and has ambulated to rest room without difficulty- no stool since embo. Denies right leg swelling, numbness, tingling. Female visitor/family at bedside with questions. Questions about bedside ultrasound performed this am and about angio seal. Patient confirms he did not receive blood transfusion.  Objective:    Laboratory:      Recent Labs     05/29/24  1002 05/29/24  1410 05/30/24  0018 05/30/24  0603 05/30/24  1317   HGB 12.4   < > 9.4*   < > 9.5*   HCT 35.5*   < > 27.4*   < > 27.1*   WBC 6.8  --  7.3  --   --      --  155  --   --    INR 1.0  --   --   --   --    BUN 26*  --  21*  --   --    K 4.0  --  3.6  --   --     < > = values in this interval not displayed.       Imaging:  PROCEDURE: Mesenteric angiogram and embolization     PROCEDURE SUMMARY:  1.  Inferior mesenteric angiogram (first order)  2.  Left colic artery angiogram (second order)  3.  Marginal artery angiogram and multiple vasa recta angiograms (third order  and more distal)  4.  Coil embolization of a bleeding vasa recta  5.  Right common femoral artery angiogram  6.  Ultrasound guidance for arterial access  7.  Use of closure device for closing of arteriotomy  8.  Radiological supervision and interpretation     INDICATION:  Mr. Mabry is a 74 y.o. man with COPD, DM2, HTN, HLD, and  diverticulosis post sigmoidectomy who presents with bright red blood per rectum  and CTA positive for active bleeding in the descending colon.      OPERATING PHYSICIAN: Ev Haywood M.D.     ESTIMATED BLOOD LOSS: Minimal     SPECIMENS REMOVED: None     FLUORO TIME: 11.2 min  AIR    Diet advanced per GI's direction.   If signs of rebleeding, consider obtaining scan vs colonoscopy.    Please note that Dr. Colton Crowder participated in the provision of these services.      Barbie Leiva PA-C  Interventional Radiology  [facility]  [facility]  [phone]  [facility] [phone]  [facility] [phone]  [facility] [phone]

## 2024-05-30 NOTE — PROGRESS NOTES
0400: Report given to Melodie RN as this RN is being floated to another unit.     0700: Report received from Melodie RN.    0759: MD notified that Pt is requesting breakfast tray and diet order is still NPO. Per MD, do not give food until GI places diet order.     1128: MD notified Pt's dexcom G7 alarming as 58. RN immediately did fingerstick and confirmed glucose was 75. Pt calibrated dexcom G7 and given juice and ginger ale.

## 2024-05-30 NOTE — PROGRESS NOTES
Pt code KRISTY then escalated to ATLAS. Pt reports he wants to leave. Pt aggressive, yelling at staff, difficulty redirecting. Pt begins with AMA process then leans on to counter stating that he has \"no choice\" but to stay after IV was removed. Pt taken back to room, placed in bed ans medicated. Pt agrees to change aggressive behavior and currently calm and cooperative. Pt IV restarted, not fully consenting to blood glucose check, states it is fine. Pt aware he is NPO except meds at this time.Pt placed back on cardiac monitor, vital signs obtained.

## 2024-05-30 NOTE — PROGRESS NOTES
0430  Report given to Melodie (oncoming nurse) by CHRISTOPHER Melton (offgoing nurse). Report included the following information SBAR, Kardex, ED Summary, Intake/Output, and Recent Results.        0700  Bedside shift change report given to CHRISTOPHER Melton (oncoming nurse) by Melodie (offgoing nurse). Report included the following information SBAR, Kardex, ED Summary, Intake/Output, and Recent Results.         This patient was assisted with Intentional Toileting every 2 hours during this shift as appropriate.  Documentation of ambulation and output reflected on Flowsheet as appropriate.  Purposeful hourly rounding was completed using AIDET and 5Ps.  Outcomes of PHR documented as they occurred. Bed alarm in use as appropriate.  Dual Suction and ambubag in place.

## 2024-05-30 NOTE — PROGRESS NOTES
[facility].  Mr. Mabry is [employer]. He was sound asleep. Prayer for spiritual communion offered for his well-being.     . CAROLYNN Luevano, RN, ACSW, LCSW   Page:  [phone]

## 2024-05-30 NOTE — PROGRESS NOTES
Linh Faith PA-C                       [phone] office             Monday-Friday 8:00 am-4:30 pm  I am not permitted to use \"perfect serve\" use above for contact, thanks.        Gastroenterology Progress Note    May 30, 2024  Admit Date: 5/29/2024         Narrative Assessment and Plan   74 y.o. male admitted with active diverticular bleeding on CTA, now s/p IR embolization. He reports feeling comfortable overall, some mild RLQ abdominal pain. No N/V.       Impression:  Diverticular bleeding s/p IR embolization   Biliary dilation on CT   Elevated LFTs     Plan:  LFTs slightly elevated today (, ), RUQ US with common bile duct 0.6 cm in diameter, reported as likely physiologic post cholecystectomy. Continue to follow LFTs, no further inpatient workup required at this time.   No more bloody BMs after IR embolization yesterday, hgb decreased to 9.4 possibly catching up to prior blood loss. Repeat hemoglobin and hematocrit ordered. Clear liquid diet for now.    Linh Faith PA-C    Subjective:   Chief Complaint: Gastrointestinal Bleeding     History of Present Illness: Patient is a 74 y.o. male with a history of type 1 diabetes, psoriatic arthritis, hypertension and chronic NSAID use admitted on 5/29/2024 with several episodes of hematochezia over the past day. Patient reports associated crampy, lower abdominal pain and lightheadedness, but denies any hematemesis, melena, or weight loss.  Patient denies any prior similar episodes. He takes celebrex and aspirin 81mg daily and diclofenac as needed for pain. History of partial colectomy years ago at [facility] for obstruction. Last colonoscopy 12/9/19 by Dr. Chavez with diverticulosis, tattoo in transverse colon and ascending colon. Due for screening colonoscopy this year.       05/30/24 : patient underwent IR embolization yesterday, no more bloody BM since  the procedure. Reports mild lower abdominal pain. US performed this morning.    ROS:  The previous review of systems on initial consultation / H&P is noted and reviewed.  Specific changes noted above in HPI.    Current Medications:     Current Facility-Administered Medications   Medication Dose Route Frequency    oxyCODONE (ROXICODONE) immediate release tablet 5 mg  5 mg Oral Q8H PRN    sennosides-docusate sodium (SENOKOT-S) 8.6-50 MG tablet 2 tablet  2 tablet Oral Daily PRN    sodium chloride flush 0.9 % injection 5-40 mL  5-40 mL IntraVENous 2 times per day    sodium chloride flush 0.9 % injection 5-40 mL  5-40 mL IntraVENous PRN    0.9 % sodium chloride infusion   IntraVENous PRN    ondansetron (ZOFRAN-ODT) disintegrating tablet 4 mg  4 mg Oral Q8H PRN    Or    ondansetron (ZOFRAN) injection 4 mg  4 mg IntraVENous Q6H PRN    polyethylene glycol (GLYCOLAX) packet 17 g  17 g Oral Daily PRN    acetaminophen (TYLENOL) tablet 650 mg  650 mg Oral Q6H PRN    Or    acetaminophen (TYLENOL) suppository 650 mg  650 mg Rectal Q6H PRN    insulin lispro (HUMALOG,ADMELOG) injection vial 0-8 Units  0-8 Units SubCUTAneous TID WC    insulin lispro (HUMALOG,ADMELOG) injection vial 0-4 Units  0-4 Units SubCUTAneous Nightly    insulin glargine (LANTUS) injection vial 20 Units  20 Units SubCUTAneous Nightly    dextrose bolus 10% 125 mL  125 mL IntraVENous PRN    Or    dextrose bolus 10% 250 mL  250 mL IntraVENous PRN    dextrose 10 % infusion   IntraVENous Continuous PRN    amLODIPine (NORVASC) tablet 10 mg  10 mg Oral Daily    DULoxetine (CYMBALTA) extended release capsule 120 mg  120 mg Oral Daily    lisinopril (PRINIVIL;ZESTRIL) tablet 40 mg  40 mg Oral Daily    nicotine (NICODERM CQ) 14 MG/24HR 1 patch  1 patch TransDERmal Q24H    oxyBUTYnin (DITROPAN-XL) extended release tablet 10 mg  10 mg Oral Daily    rosuvastatin (CRESTOR) tablet 20 mg  20 mg Oral Daily    pantoprazole (PROTONIX) 40 mg in sodium chloride (PF) 0.9 % 10 mL

## 2024-05-31 VITALS
OXYGEN SATURATION: 94 % | DIASTOLIC BLOOD PRESSURE: 61 MMHG | TEMPERATURE: 97.7 F | HEART RATE: 63 BPM | SYSTOLIC BLOOD PRESSURE: 117 MMHG | RESPIRATION RATE: 16 BRPM

## 2024-05-31 DIAGNOSIS — E10.69 TYPE 1 DIABETES MELLITUS WITH OTHER SPECIFIED COMPLICATION (HCC): ICD-10-CM

## 2024-05-31 DIAGNOSIS — D50.0 BLOOD LOSS ANEMIA: Primary | ICD-10-CM

## 2024-05-31 DIAGNOSIS — K57.91 GASTROINTESTINAL HEMORRHAGE ASSOCIATED WITH INTESTINAL DIVERTICULOSIS: ICD-10-CM

## 2024-05-31 DIAGNOSIS — K57.31 DIVERTICULAR HEMORRHAGE: ICD-10-CM

## 2024-05-31 PROBLEM — Z72.0 TOBACCO USE: Status: ACTIVE | Noted: 2024-01-04

## 2024-05-31 PROBLEM — F11.90 CHRONIC, CONTINUOUS USE OF OPIOIDS: Status: ACTIVE | Noted: 2024-05-31

## 2024-05-31 PROBLEM — E66.9 OBESITY: Status: ACTIVE | Noted: 2024-05-31

## 2024-05-31 PROBLEM — Z90.49 HISTORY OF COLECTOMY: Status: ACTIVE | Noted: 2024-05-31

## 2024-05-31 LAB
ALBUMIN SERPL-MCNC: 2.5 G/DL (ref 3.5–5)
ALBUMIN/GLOB SERPL: 1 (ref 1.1–2.2)
ALP SERPL-CCNC: 101 U/L (ref 45–117)
ALT SERPL-CCNC: 85 U/L (ref 12–78)
ANION GAP SERPL CALC-SCNC: 0 MMOL/L (ref 5–15)
ANION GAP SERPL CALC-SCNC: 2 MMOL/L (ref 5–15)
AST SERPL-CCNC: 40 U/L (ref 15–37)
BASOPHILS # BLD: 0 K/UL (ref 0–0.1)
BASOPHILS NFR BLD: 1 % (ref 0–1)
BILIRUB SERPL-MCNC: 0.4 MG/DL (ref 0.2–1)
BUN SERPL-MCNC: 11 MG/DL (ref 6–20)
BUN SERPL-MCNC: 12 MG/DL (ref 6–20)
BUN/CREAT SERPL: 16 (ref 12–20)
BUN/CREAT SERPL: 17 (ref 12–20)
CALCIUM SERPL-MCNC: 7.9 MG/DL (ref 8.5–10.1)
CALCIUM SERPL-MCNC: 8.1 MG/DL (ref 8.5–10.1)
CHLORIDE SERPL-SCNC: 111 MMOL/L (ref 97–108)
CHLORIDE SERPL-SCNC: 115 MMOL/L (ref 97–108)
CO2 SERPL-SCNC: 29 MMOL/L (ref 21–32)
CO2 SERPL-SCNC: 29 MMOL/L (ref 21–32)
COMMENT:: NORMAL
CREAT SERPL-MCNC: 0.68 MG/DL (ref 0.7–1.3)
CREAT SERPL-MCNC: 0.72 MG/DL (ref 0.7–1.3)
DIFFERENTIAL METHOD BLD: ABNORMAL
EOSINOPHIL # BLD: 0.5 K/UL (ref 0–0.4)
EOSINOPHIL NFR BLD: 7 % (ref 0–7)
ERYTHROCYTE [DISTWIDTH] IN BLOOD BY AUTOMATED COUNT: 13 % (ref 11.5–14.5)
EST. AVERAGE GLUCOSE BLD GHB EST-MCNC: 160 MG/DL
FERRITIN SERPL-MCNC: 72 NG/ML (ref 26–388)
FOLATE SERPL-MCNC: 35.4 NG/ML (ref 5–21)
GLOBULIN SER CALC-MCNC: 2.6 G/DL (ref 2–4)
GLUCOSE BLD STRIP.AUTO-MCNC: 266 MG/DL (ref 65–117)
GLUCOSE BLD STRIP.AUTO-MCNC: 56 MG/DL (ref 65–117)
GLUCOSE BLD STRIP.AUTO-MCNC: 65 MG/DL (ref 65–117)
GLUCOSE SERPL-MCNC: 169 MG/DL (ref 65–100)
GLUCOSE SERPL-MCNC: 73 MG/DL (ref 65–100)
HAPTOGLOB SERPL-MCNC: 112 MG/DL (ref 30–200)
HBA1C MFR BLD: 7.2 % (ref 4–5.6)
HCT VFR BLD AUTO: 24.3 % (ref 36.6–50.3)
HCT VFR BLD AUTO: 25.5 % (ref 36.6–50.3)
HCT VFR BLD AUTO: 26.5 % (ref 36.6–50.3)
HCT VFR BLD AUTO: 27.5 % (ref 36.6–50.3)
HGB BLD-MCNC: 8.5 G/DL (ref 12.1–17)
HGB BLD-MCNC: 9 G/DL (ref 12.1–17)
HGB BLD-MCNC: 9.2 G/DL (ref 12.1–17)
HGB BLD-MCNC: 9.4 G/DL (ref 12.1–17)
IMM GRANULOCYTES # BLD AUTO: 0 K/UL (ref 0–0.04)
IMM GRANULOCYTES NFR BLD AUTO: 0 % (ref 0–0.5)
IRON SATN MFR SERPL: 20 % (ref 20–50)
IRON SERPL-MCNC: 39 UG/DL (ref 35–150)
LACTATE SERPL-SCNC: 0.8 MMOL/L (ref 0.4–2)
LDH SERPL L TO P-CCNC: 164 U/L (ref 85–241)
LYMPHOCYTES # BLD: 1.5 K/UL (ref 0.8–3.5)
LYMPHOCYTES NFR BLD: 21 % (ref 12–49)
MAGNESIUM SERPL-MCNC: 1.6 MG/DL (ref 1.6–2.4)
MCH RBC QN AUTO: 34.8 PG (ref 26–34)
MCHC RBC AUTO-ENTMCNC: 34.2 G/DL (ref 30–36.5)
MCV RBC AUTO: 101.9 FL (ref 80–99)
MONOCYTES # BLD: 0.5 K/UL (ref 0–1)
MONOCYTES NFR BLD: 7 % (ref 5–13)
NEUTS SEG # BLD: 4.7 K/UL (ref 1.8–8)
NEUTS SEG NFR BLD: 64 % (ref 32–75)
NRBC # BLD: 0 K/UL (ref 0–0.01)
NRBC BLD-RTO: 0 PER 100 WBC
PLATELET # BLD AUTO: 169 K/UL (ref 150–400)
PMV BLD AUTO: 10.2 FL (ref 8.9–12.9)
POTASSIUM SERPL-SCNC: 3.3 MMOL/L (ref 3.5–5.1)
POTASSIUM SERPL-SCNC: 3.6 MMOL/L (ref 3.5–5.1)
PROT SERPL-MCNC: 5.1 G/DL (ref 6.4–8.2)
RBC # BLD AUTO: 2.7 M/UL (ref 4.1–5.7)
RETICS # AUTO: 0.06 M/UL (ref 0.03–0.1)
RETICS/RBC NFR AUTO: 2.2 % (ref 0.7–2.1)
SERVICE CMNT-IMP: ABNORMAL
SERVICE CMNT-IMP: ABNORMAL
SERVICE CMNT-IMP: NORMAL
SODIUM SERPL-SCNC: 140 MMOL/L (ref 136–145)
SODIUM SERPL-SCNC: 146 MMOL/L (ref 136–145)
SPECIMEN HOLD: NORMAL
TIBC SERPL-MCNC: 193 UG/DL (ref 250–450)
TROPONIN I SERPL HS-MCNC: 11 NG/L (ref 0–76)
TSH SERPL DL<=0.05 MIU/L-ACNC: 0.54 UIU/ML (ref 0.36–3.74)
VIT B12 SERPL-MCNC: 1330 PG/ML (ref 193–986)
WBC # BLD AUTO: 7.3 K/UL (ref 4.1–11.1)

## 2024-05-31 PROCEDURE — C9113 INJ PANTOPRAZOLE SODIUM, VIA: HCPCS | Performed by: STUDENT IN AN ORGANIZED HEALTH CARE EDUCATION/TRAINING PROGRAM

## 2024-05-31 PROCEDURE — 83615 LACTATE (LD) (LDH) ENZYME: CPT

## 2024-05-31 PROCEDURE — 84443 ASSAY THYROID STIM HORMONE: CPT

## 2024-05-31 PROCEDURE — 6370000000 HC RX 637 (ALT 250 FOR IP)

## 2024-05-31 PROCEDURE — 85045 AUTOMATED RETICULOCYTE COUNT: CPT

## 2024-05-31 PROCEDURE — 85014 HEMATOCRIT: CPT

## 2024-05-31 PROCEDURE — 83550 IRON BINDING TEST: CPT

## 2024-05-31 PROCEDURE — 82746 ASSAY OF FOLIC ACID SERUM: CPT

## 2024-05-31 PROCEDURE — 82728 ASSAY OF FERRITIN: CPT

## 2024-05-31 PROCEDURE — 83735 ASSAY OF MAGNESIUM: CPT

## 2024-05-31 PROCEDURE — 2580000003 HC RX 258: Performed by: STUDENT IN AN ORGANIZED HEALTH CARE EDUCATION/TRAINING PROGRAM

## 2024-05-31 PROCEDURE — 83036 HEMOGLOBIN GLYCOSYLATED A1C: CPT

## 2024-05-31 PROCEDURE — 83540 ASSAY OF IRON: CPT

## 2024-05-31 PROCEDURE — 82607 VITAMIN B-12: CPT

## 2024-05-31 PROCEDURE — 36415 COLL VENOUS BLD VENIPUNCTURE: CPT

## 2024-05-31 PROCEDURE — 80053 COMPREHEN METABOLIC PANEL: CPT

## 2024-05-31 PROCEDURE — 83605 ASSAY OF LACTIC ACID: CPT

## 2024-05-31 PROCEDURE — 84484 ASSAY OF TROPONIN QUANT: CPT

## 2024-05-31 PROCEDURE — 85018 HEMOGLOBIN: CPT

## 2024-05-31 PROCEDURE — 6370000000 HC RX 637 (ALT 250 FOR IP): Performed by: STUDENT IN AN ORGANIZED HEALTH CARE EDUCATION/TRAINING PROGRAM

## 2024-05-31 PROCEDURE — 94761 N-INVAS EAR/PLS OXIMETRY MLT: CPT

## 2024-05-31 PROCEDURE — 93005 ELECTROCARDIOGRAM TRACING: CPT | Performed by: EMERGENCY MEDICINE

## 2024-05-31 PROCEDURE — 85025 COMPLETE CBC W/AUTO DIFF WBC: CPT

## 2024-05-31 PROCEDURE — 2580000003 HC RX 258

## 2024-05-31 PROCEDURE — 83010 ASSAY OF HAPTOGLOBIN QUANT: CPT

## 2024-05-31 PROCEDURE — 6360000002 HC RX W HCPCS: Performed by: STUDENT IN AN ORGANIZED HEALTH CARE EDUCATION/TRAINING PROGRAM

## 2024-05-31 PROCEDURE — 82962 GLUCOSE BLOOD TEST: CPT

## 2024-05-31 RX ORDER — INSULIN GLARGINE 100 [IU]/ML
17 INJECTION, SOLUTION SUBCUTANEOUS NIGHTLY
Status: DISCONTINUED | OUTPATIENT
Start: 2024-05-31 | End: 2024-05-31 | Stop reason: HOSPADM

## 2024-05-31 RX ORDER — POTASSIUM CHLORIDE 750 MG/1
20 TABLET, FILM COATED, EXTENDED RELEASE ORAL ONCE
Status: COMPLETED | OUTPATIENT
Start: 2024-05-31 | End: 2024-05-31

## 2024-05-31 RX ORDER — POLYETHYLENE GLYCOL 3350 17 G/17G
17 POWDER, FOR SOLUTION ORAL DAILY PRN
Qty: 10 EACH | Refills: 0 | Status: SHIPPED | OUTPATIENT
Start: 2024-05-31 | End: 2024-06-30

## 2024-05-31 RX ORDER — ASPIRIN 81 MG/1
81 TABLET, CHEWABLE ORAL DAILY
Status: DISCONTINUED | OUTPATIENT
Start: 2024-05-31 | End: 2024-05-31 | Stop reason: HOSPADM

## 2024-05-31 RX ADMIN — SODIUM CHLORIDE 500 ML: 9 INJECTION, SOLUTION INTRAVENOUS at 00:09

## 2024-05-31 RX ADMIN — SENNOSIDES AND DOCUSATE SODIUM 2 TABLET: 8.6; 5 TABLET ORAL at 09:49

## 2024-05-31 RX ADMIN — CLOBETASOL PROPIONATE 1 EACH: 0.5 OINTMENT TOPICAL at 08:45

## 2024-05-31 RX ADMIN — POTASSIUM CHLORIDE 20 MEQ: 750 TABLET, FILM COATED, EXTENDED RELEASE ORAL at 06:19

## 2024-05-31 RX ADMIN — GABAPENTIN 1200 MG: 300 CAPSULE ORAL at 08:45

## 2024-05-31 RX ADMIN — AMLODIPINE BESYLATE 10 MG: 5 TABLET ORAL at 08:45

## 2024-05-31 RX ADMIN — POLYETHYLENE GLYCOL 3350 17 G: 17 POWDER, FOR SOLUTION ORAL at 09:49

## 2024-05-31 RX ADMIN — OXYBUTYNIN CHLORIDE 10 MG: 5 TABLET, EXTENDED RELEASE ORAL at 08:45

## 2024-05-31 RX ADMIN — INSULIN LISPRO 4 UNITS: 100 INJECTION, SOLUTION INTRAVENOUS; SUBCUTANEOUS at 12:44

## 2024-05-31 RX ADMIN — GABAPENTIN 1200 MG: 300 CAPSULE ORAL at 14:21

## 2024-05-31 RX ADMIN — LISINOPRIL 40 MG: 20 TABLET ORAL at 08:45

## 2024-05-31 RX ADMIN — DULOXETINE HYDROCHLORIDE 120 MG: 30 CAPSULE, DELAYED RELEASE ORAL at 08:45

## 2024-05-31 RX ADMIN — SODIUM CHLORIDE, PRESERVATIVE FREE 40 MG: 5 INJECTION INTRAVENOUS at 08:46

## 2024-05-31 RX ADMIN — SODIUM CHLORIDE, PRESERVATIVE FREE 10 ML: 5 INJECTION INTRAVENOUS at 08:47

## 2024-05-31 RX ADMIN — ROSUVASTATIN CALCIUM 20 MG: 10 TABLET, COATED ORAL at 08:45

## 2024-05-31 NOTE — PLAN OF CARE
Problem: Pain  Goal: Verbalizes/displays adequate comfort level or baseline comfort level  5/31/2024 1013 by Brittany Rice, RN  Outcome: Progressing       Problem: Safety - Adult  Goal: Free from fall injury  Outcome: Progressing

## 2024-05-31 NOTE — DISCHARGE INSTRUCTIONS
HOME DISCHARGE INSTRUCTIONS    Deric Mabry / [id number] : 1950    Admission date: 2024 Discharge date: 2024     Please bring this form with you to show your care provider at your follow-up appointment.    Primary care provider:  Dr. Joby Leigh    Discharging provider:  David Burnette MD  - Family Medicine Resident  Dr. Kincaid - Attending, Family Medicine     You have been admitted to the hospital with the following diagnoses:    ACUTE DIAGNOSES:  GI bleed [K92.2]  Diverticular hemorrhage [K57.31]    You were treated for a lower gastrointestinal bleed during your admission. This was treated by the interventional radiology team with a procedure that stopped the bleeding through the blood vessels. Your blood levels have remained stable since that procedure.  . . . . . . . . . . . . . . . . . . . . . . . . . . . . . . . . . . . . . . . . . . . . . . . . . . . . . . . . . . . . . . . . . . . . . . . .   Medication changes:  - Resume taking your aspirin on 24.  - as needed miralax has been prescribed for you for constipation. Take this once daily as needed per instructions.    It is VERY important that you avoid taking NSAID medications (advil, ibuprofen, naproxen, voltaren) as these medications can increase your risk of recurrent gastrointestinal bleeding.  - the gastrointestinal team has recommended arthrotec or Advil Dual Action as needed instead    Appointments  Future Appointments   Date Time Provider Department Center   10/15/2024  1:00 PM Myrtle Benjamin APRN - NP [facility] BS AMB     No follow-up provider specified.       Please follow up with your PCP regarding:  - repeat lab work as recommended below  - ongoing management of your blood sugar  - ongoing management of your psoriasis    Please follow up with GI regarding:  - 4 week follow up to monitor your liver enzymes    Follow-up tests needed:   - we recommend a repeat check of your blood levels (hemoglobin)  - we recommend a  repeat check of your liver enzymes (LFTs - liver function test)    Pending test results:   At the time of your discharge the following test results are still pending: Vitamin B12 and folate, ferritin.   Please make sure you review these results with your outpatient follow-up provider(s).    Specific symptoms to watch for: chest pain, shortness of breath, fever, chills, nausea, vomiting, diarrhea, change in mentation, falling, weakness, bleeding.     DIET/what to eat:   GI bland    ACTIVITY:  activity as tolerated    Wound care: N/A    Equipment needed:  N/A    What to do if new or unexpected symptoms occur?    If you experience any of the above symptoms (or should other concerns or questions arise after discharge) please call your primary care physician. Return to the emergency room if you cannot get hold of your doctor.    It is very important that you keep your follow-up appointment(s).  Please bring discharge papers, medication list (and/or medication bottles) to your follow-up appointments for review by your outpatient provider(s).  Please check the list of medications and be sure it includes every medication (even non-prescription medications) that your provider wants you to take.    It is important that you take the medication exactly as they are prescribed.   Keep your medication in the bottles provided by the pharmacist and keep a list of the medication names, dosages, and times to be taken in your wallet.   Do not take other medications without consulting your doctor.   If you have any questions about your medications or other instructions, please talk to your nurse or care provider before you leave the hospital.     Information obtained by:     I understand that if any problems occur once I am at home I am to contact my physician.    These instructions were explained to me and I had the opportunity to ask questions.    I understand and acknowledge receipt of the instructions indicated above.

## 2024-05-31 NOTE — PROGRESS NOTES
[address] [place] [address]   Office [phone]  Fax [phone]          Subjective / Objective     Subjective  Overnight Events:Had a rapid response called for an episode of chest pain that resolved spontaneously without medication    Patient seen and examined at bedside. Reports feeling well this AM. Has some mild abdominal pain at insertion site. Denies CP, SOB, N/V, dysuria.    Respiratory:   Room air   Vitals:    05/31/24 0333   BP: (!) 117/52   Pulse: 54   Resp: 12   Temp: 97.4 °F (36.3 °C)   SpO2:      Physical Examination:   General appearance - alert, well appearing, and in no distress  Chest - clear to auscultation, no wheezes, rales or rhonchi, symmetric air entry  Heart - normal rate, regular rhythm, normal S1, S2, no murmurs, rubs, clicks or gallops,   Abdomen - soft, mild RLQ tenderness at insertion site, dressing is clean and dry, nondistended, no masses or organomegaly  Neurological - alert, oriented, normal speech, no focal findings  Extremities - peripheral pulses normal, no pedal edema, no clubbing or cyanosis  Psychiatric - normal speech and thought processes    I/O:  05/30 0701 - 05/31 0700  In: -   Out: 575 [Urine:575]  Inpatient Medications  @[facility]@  Current Facility-Administered Medications   Medication Dose Route Frequency    potassium chloride (KLOR-CON) extended release tablet 20 mEq  20 mEq Oral Once    oxyCODONE (ROXICODONE) immediate release tablet 5 mg  5 mg Oral Q8H PRN    sennosides-docusate sodium (SENOKOT-S) 8.6-50 MG tablet 2 tablet  2 tablet Oral Daily PRN    clobetasol (TEMOVATE) 0.05 % ointment   Topical Q12H    white petrolatum ointment   Topical BID PRN    sodium chloride flush 0.9 % injection 5-40 mL  5-40 mL IntraVENous 2 times per day    sodium chloride flush 0.9 % injection 5-40 mL  5-40 mL IntraVENous PRN    0.9 % sodium chloride infusion   IntraVENous PRN    ondansetron (ZOFRAN-ODT) disintegrating tablet 4 mg  4 mg Oral Q8H PRN    Or     with    Rectal Bleeding

## 2024-05-31 NOTE — DISCHARGE SUMMARY
[address] [place] [address]   Office [phone]  Fax [phone]       Discharge / Transfer / Off-Service Note     Name: Deric Mabry MRN: [id number]  Sex: Male   YOB: 1950  Age: 74 y.o.  PCP: Joby Leigh MD     Date of admission: 5/29/2024  Date of discharge/transfer: 5/31/2024    Attending physician at admission: Jose J Kincaid.  Attending physician at discharge/transfer: Jose J Kincaid.  Resident physician at discharge/transfer: Miya Hutton MD     Consultants during hospitalization  IP CONSULT TO GI  IP CONSULT TO FAMILY MEDICINE     Admission diagnoses   GI bleed [K92.2]  Diverticular hemorrhage [K57.31]    Recommended follow-up after discharge    1. PCP-Joby Leigh MD  2. [facility]     To follow up on with PCP:   - Follow up outpatient with GI, plan for colonoscopy in 8 weeks and LFT recheck in 4 weeks  ------------------------------------------------------------------------------------------------------------------    History of Present Illness    As per admitting provider, Daphney Ponce MD :   \"Deric Mabry is a 74 y.o. male with known history of T1DM, psoriatic arthritis, hypertension, HLD, osteoarthritis, small bowel obstruction status post partial colectomy 10 years ago, chronic pain, depression, tobacco use who presents to the ER complaining of shoulder persistent bright red blood per rectum acute onset last night with associated cramping abdominal pain predominantly in the right lower quadrant.  On admission to the ER patient noted to have drop in hemoglobin over 17-11 with persistent bleeding and CTA revealed evidence of bleed.  Patient emergently taken for embolization with IR.  Patient also evaluated by GI.  Currently patient reports that pain is controlled.  Does note history of significant NSAID use, currently has prescription for celecoxib for chronic arthritic pain.  Also takes aspirin daily as well as  tablet  Commonly known as: CIALIS     traZODone 50 MG tablet  Commonly known as: DESYREL     Vitamin D3 50 MCG (2000 UT) Caps            STOP taking these medications      ADVIL PO     diclofenac 50 MG EC tablet  Commonly known as: VOLTAREN     lidocaine 5 %  Commonly known as: LIDODERM            ASK your doctor about these medications      predniSONE 5 MG tablet  Commonly known as: DELTASONE               Where to Get Your Medications        These medications were sent to [facility] [id number] - [place], [place] - [address] [facility] -  [phone] - F [phone]  [address] [facility] [address]      Phone: [phone]   polyethylene glycol 17 g packet          Diet:  Regular diet.    Activity:  As tolerated     Disposition: Home     Discharge instructions to patient/family  Please seek medical attention for any new or worsening symptoms particularly chest pain, shortness of breath, fever, chills, nausea, vomiting, diarrhea, change in mentation, falling, weakness, bleeding.    Follow up plans/appointments  Follow-up Information       Follow up With Specialties Details Why Contact Info    Joby Leigh MD Family Medicine Go to  [id number] [place] [address] [phone]               Miya Hutton MD  Family Medicine Resident       For Billing    Chief Complaint   Patient presents with    Rectal Bleeding

## 2024-05-31 NOTE — PROGRESS NOTES
Rapid Called at 0028    Responded to RRT at 0028 for Chest Pain    Provider at bedside: YES  Interventions ordered: Labs and EKG  Sepsis Suspected: No  Transfer to Higher Level of Care: na    Pt started to have general CP. RRT was called, MD at bedside was assessing pt, EKG is done, lab was drawn. Pt is AOX4, GCS15, slightly SOB, with 4/10 general CP.           Rapid Ended at 0046  RRT RN assisted with transport to accepting unit No.    Francisco Leong RN

## 2024-05-31 NOTE — PROGRESS NOTES
Linh Faith PA-C                       [phone] office             Monday-Friday 8:00 am-4:30 pm  I am not permitted to use \"perfect serve\" use above for contact, thanks.        Gastroenterology Progress Note    May 31, 2024  Admit Date: 5/29/2024         Narrative Assessment and Plan   74 y.o. male admitted with active diverticular bleeding on CTA, now s/p IR embolization. Hgb stable at 9.0, .9, B12, folate, TSH, and iron panel ordered. No BMs since embolization 5/29/24.     RUQ US with common bile duct 0.6 cm in diameter, reported as likely physiologic post cholecystectomy. ALT and AST improved from yesterday.     RR called for chest pain last night, chest pain spontaneously resolved. EKG sinus bradycardia w/ first degree AV block, HR has been in 50-60s range throughout hospital stay. Troponin 11. Some reproducible chest wall tenderness that resolved.     Impression:  Diverticular bleeding s/p IR embolization   Biliary dilation on CT   Elevated LFTs     Plan:  Aspirin 81 mg can be resumed this evening or tomorrow morning to allow for 48 hours after embolization.   Advance diet to low fiber as tolerated today, this may help with hypoglycemia episodes.    Avoid chronic NSAIDs, opt instead for Arhtrotec or Advil Dual Action if needed for arthritis pain.   Follow up in our office in 4 weeks for re-check of LFTs and to plan for colonoscopy which we should do 8 weeks from now.   TSH, B12, folate, and iron panel in process today, we can follow up on these labs in the office.     Inpatient GI will sign off, please re-consult if needed further.     Linh Faith PA-C    Subjective:   Chief Complaint: Gastrointestinal Bleeding     History of Present Illness: Patient is a 74 y.o. male with a history of type 1 diabetes, psoriatic arthritis, hypertension and chronic NSAID use admitted on 5/29/2024 with several

## 2024-05-31 NOTE — PROGRESS NOTES
[facility] Family Medicine Residency [facility]  Affiliated with [facility]     S:   1230 Responded to rapid response for chest pain  Patient reports substernal chest discomfort.  \"A little\" short of breath  Feels cold    O:  BP (!) 95/48   Pulse 64   Temp 97.7 °F (36.5 °C) (Oral)   Resp 21   SpO2 90%     Physical Examination  General appearance - alert, in no distress  Chest - clear to auscultation, no wheezes, rales or rhonchi, symmetric air entry, chest wall tender to palpation  Heart - bradycardic, regular rhythm, normal S1, S2, no murmurs  Extremities - no pedal edema, no clubbing or cyanosis    A/P:     Chest pain:  - EKG sinus bradycardia w/ first degree AV block -- HR has been in 50-60s throughout hospital stay  - Vital signs otherwise stable and lung sounds clear, will hold off on CXR  - Receiving 500 cc bolus prior to rapid response initiation  - Obtain Hgb, BMP, Troponin  - Patient reported chest wall tenderness to palpation on exam. Chest pain had resolved prior to my leaving room.    Lula Aly,   Family Medicine Resident

## 2024-06-01 LAB
EKG DIAGNOSIS: NORMAL
EKG Q-T INTERVAL: 500 MS
EKG QRS DURATION: 176 MS
EKG QTC CALCULATION (BAZETT): 503 MS
EKG R AXIS: 97 DEGREES
EKG T AXIS: 17 DEGREES
EKG VENTRICULAR RATE: 61 BPM

## 2024-06-03 ENCOUNTER — TELEPHONE (OUTPATIENT)
Age: 74
End: 2024-06-03

## 2024-06-03 DIAGNOSIS — M05.9 RHEUMATOID ARTHRITIS WITH POSITIVE RHEUMATOID FACTOR, INVOLVING UNSPECIFIED SITE (HCC): Primary | ICD-10-CM

## 2024-06-03 RX ORDER — GABAPENTIN 300 MG/1
1200 CAPSULE ORAL 3 TIMES DAILY
Qty: 90 CAPSULE | Refills: 3 | Status: SHIPPED | OUTPATIENT
Start: 2024-06-03 | End: 2024-06-05 | Stop reason: SDUPTHER

## 2024-06-03 NOTE — TELEPHONE ENCOUNTER
Care Transitions Initial Follow Up Call    Outreach made within 2 business days of discharge: Yes    Patient: Deric Mabry Patient : 1950   MRN: [id number]  Reason for Admission: There are no discharge diagnoses documented for the most recent discharge.  Discharge Date: 24       Spoke with: Patient    Discharge department/facility: [facility]    TCM Interactive Patient Contact:  Was patient able to fill all prescriptions: Yes  Was patient instructed to bring all medications to the follow-up visit: Yes  Is patient taking all medications as directed in the discharge summary? Yes  Does patient understand their discharge instructions: Yes  Does patient have questions or concerns that need addressed prior to 7-14 day follow up office visit: no    Scheduled appointment with PCP within 7-14 days    Follow Up  Future Appointments   Date Time Provider Department Center   2024  3:00 PM Joby Leigh MD [facility] BS AMB   10/15/2024  1:00 PM Myrtle Benjamin, APRN - NP [facility] BS AMB       Alexandre Llanos RN

## 2024-06-03 NOTE — TELEPHONE ENCOUNTER
Medication Refill Request    Deric Mabry is requesting a refill of the following medication(s):   Requested Prescriptions     Pending Prescriptions Disp Refills    gabapentin (NEURONTIN) 300 MG capsule 90 capsule      Sig: Take 4 capsules by mouth 3 times daily. Max Daily Amount: 3,600 mg        Listed PCP is Joby Leigh MD   Last provider to prescribe medication: Historical   Date of Last Office Visit at [facility]: 05/28/24 with Dr. Leigh    FUTURE APPOINTMENT:   Future Appointments   Date Time Provider Department Center   6/5/2024  3:00 PM Joby Leigh MD [facility] BS AMB   10/15/2024  1:00 PM Myrtle Benjamin, APRN - NP [facility] BS AMB       Please send refill to:    [facility] [id number] [facility] [address] [facility] [phone] - F [phone]  [address] [facility] [address]  Phone: [phone] Fax: [phone]      Please review request and approve or deny with recommendations within 48 hours.

## 2024-06-04 ENCOUNTER — TELEPHONE (OUTPATIENT)
Age: 74
End: 2024-06-04

## 2024-06-04 DIAGNOSIS — L40.9 PSORIASIS: ICD-10-CM

## 2024-06-04 DIAGNOSIS — I09.9: Primary | ICD-10-CM

## 2024-06-04 NOTE — TELEPHONE ENCOUNTER
Ly, Pharmacist for [facility], is requesting a returned phone call re: Rx for Gabapentin. She stated that she need clarity on the quantity. Her contact number is [phone].    Thank you

## 2024-06-05 ENCOUNTER — OFFICE VISIT (OUTPATIENT)
Age: 74
End: 2024-06-05
Payer: MEDICARE

## 2024-06-05 VITALS
BODY MASS INDEX: 31.4 KG/M2 | SYSTOLIC BLOOD PRESSURE: 113 MMHG | TEMPERATURE: 98.2 F | HEIGHT: 66 IN | OXYGEN SATURATION: 91 % | HEART RATE: 64 BPM | DIASTOLIC BLOOD PRESSURE: 62 MMHG | WEIGHT: 195.4 LBS

## 2024-06-05 DIAGNOSIS — K57.31 DIVERTICULAR HEMORRHAGE: Primary | ICD-10-CM

## 2024-06-05 DIAGNOSIS — R79.89 ELEVATED LFTS: ICD-10-CM

## 2024-06-05 PROCEDURE — 1123F ACP DISCUSS/DSCN MKR DOCD: CPT | Performed by: STUDENT IN AN ORGANIZED HEALTH CARE EDUCATION/TRAINING PROGRAM

## 2024-06-05 PROCEDURE — 99213 OFFICE O/P EST LOW 20 MIN: CPT | Performed by: STUDENT IN AN ORGANIZED HEALTH CARE EDUCATION/TRAINING PROGRAM

## 2024-06-05 RX ORDER — GABAPENTIN 300 MG/1
1200 CAPSULE ORAL 3 TIMES DAILY
Qty: 360 CAPSULE | Refills: 1 | Status: SHIPPED | OUTPATIENT
Start: 2024-06-05 | End: 2024-08-04

## 2024-06-05 NOTE — PROGRESS NOTES
Deric Mabry is a 74 y.o. male      Chief Complaint   Patient presents with    Follow-Up from Hospital     5/29/24 went to [facility] ED for rectal bleeding which has resolved.       \"Have you been to the ER, urgent care clinic since your last visit?  Hospitalized since your last visit?\"    YES - When: approximately 1  weeks ago.  Where and Why: [facility] ED for rectal bleeding.    “Have you seen or consulted any other health care providers outside of [facility] since your last visit?”    NO          Click Here for Release of Records Request    Vitals:    06/05/24 1456   Weight: 88.6 kg (195 lb 6.4 oz)   Height: 1.676 m (5' 5.98\")           Medication Reconciliation Completed, changes notes. Please Update medication list.  
(DEXCOM G7 SENSOR) MISC 1 EACH SUBCUTANEOUS EVERY 10 DAYS for 30 days      nicotine (NICODERM CQ) 14 MG/24HR Place 1 patch onto the skin every 24 hours      albuterol sulfate HFA (PROVENTIL;VENTOLIN;PROAIR) 108 (90 Base) MCG/ACT inhaler Inhale 2 puffs into the lungs every 4 hours as needed      oxyBUTYnin (DITROPAN-XL) 10 MG extended release tablet Take 1 tablet by mouth daily      DULoxetine (CYMBALTA) 60 MG extended release capsule Take 2 capsules by mouth daily 90 capsule 3    aspirin 81 MG EC tablet Take 1 tablet by mouth daily 90 tablet 3    econazole nitrate 1 % cream Apply topically daily Apply topically daily. 30 g 2    lisinopril (PRINIVIL;ZESTRIL) 40 MG tablet Take 1 tablet by mouth daily 90 tablet 2    rosuvastatin (CRESTOR) 20 MG tablet Take 1 tablet by mouth daily 60 tablet 3    clobetasol propionate 0.05 % LOTN lotion Apply topically 2 times daily      traZODone (DESYREL) 50 MG tablet Take 1 tablet by mouth nightly      tadalafil (CIALIS) 20 MG tablet Take 1 tablet by mouth as needed for Erectile Dysfunction      calcium carbonate 600 MG TABS tablet Take 1 tablet by mouth daily      Cholecalciferol (VITAMIN D3) 50 MCG (2000 UT) CAPS Take 1 capsule by mouth daily      Probiotic Product (PROBIOTIC-10) CHEW Take by mouth      INSULIN DEGLUDEC FLEXTOUCH SC Inject 28 Units into the skin daily      ascorbic acid (VITAMIN C) 100 MG tablet Take 5 tablets by mouth daily      insulin lispro, 1 Unit Dial, (HUMALOG KWIKPEN) 100 UNIT/ML SOPN Inject into the skin      predniSONE (DELTASONE) 5 MG tablet Take 1 tablet by mouth (Patient not taking: Reported on 5/28/2024)      amLODIPine (NORVASC) 10 MG tablet Take 1 tablet by mouth daily (Patient not taking: Reported on 6/5/2024) 90 tablet 3     No current facility-administered medications on file prior to visit.       No Known Allergies      Review of Systems:     Review of Systems as per HPI    Objective:     Vitals:    06/05/24 1456   BP: 113/62   Site: Left Upper

## 2024-07-10 ENCOUNTER — LAB (OUTPATIENT)
Age: 74
End: 2024-07-10

## 2024-07-10 DIAGNOSIS — K57.31 DIVERTICULAR HEMORRHAGE: ICD-10-CM

## 2024-07-10 DIAGNOSIS — R79.89 ELEVATED LFTS: ICD-10-CM

## 2024-07-10 LAB
ALBUMIN SERPL-MCNC: 3.1 G/DL (ref 3.5–5)
ALBUMIN/GLOB SERPL: 0.9 (ref 1.1–2.2)
ALP SERPL-CCNC: 102 U/L (ref 45–117)
ALT SERPL-CCNC: 32 U/L (ref 12–78)
ANION GAP SERPL CALC-SCNC: 6 MMOL/L (ref 5–15)
AST SERPL-CCNC: 21 U/L (ref 15–37)
BASOPHILS # BLD: 0 K/UL (ref 0–0.1)
BASOPHILS NFR BLD: 0 % (ref 0–1)
BILIRUB SERPL-MCNC: 0.3 MG/DL (ref 0.2–1)
BUN SERPL-MCNC: 15 MG/DL (ref 6–20)
BUN/CREAT SERPL: 16 (ref 12–20)
CALCIUM SERPL-MCNC: 8.3 MG/DL (ref 8.5–10.1)
CHLORIDE SERPL-SCNC: 107 MMOL/L (ref 97–108)
CO2 SERPL-SCNC: 28 MMOL/L (ref 21–32)
CREAT SERPL-MCNC: 0.92 MG/DL (ref 0.7–1.3)
DIFFERENTIAL METHOD BLD: ABNORMAL
EOSINOPHIL # BLD: 0.5 K/UL (ref 0–0.4)
EOSINOPHIL NFR BLD: 7 % (ref 0–7)
ERYTHROCYTE [DISTWIDTH] IN BLOOD BY AUTOMATED COUNT: 13.4 % (ref 11.5–14.5)
GLOBULIN SER CALC-MCNC: 3.3 G/DL (ref 2–4)
GLUCOSE SERPL-MCNC: 130 MG/DL (ref 65–100)
HCT VFR BLD AUTO: 37.1 % (ref 36.6–50.3)
HGB BLD-MCNC: 11.9 G/DL (ref 12.1–17)
IMM GRANULOCYTES # BLD AUTO: 0 K/UL (ref 0–0.04)
IMM GRANULOCYTES NFR BLD AUTO: 0 % (ref 0–0.5)
LYMPHOCYTES # BLD: 1.5 K/UL (ref 0.8–3.5)
LYMPHOCYTES NFR BLD: 22 % (ref 12–49)
MCH RBC QN AUTO: 31.3 PG (ref 26–34)
MCHC RBC AUTO-ENTMCNC: 32.1 G/DL (ref 30–36.5)
MCV RBC AUTO: 97.6 FL (ref 80–99)
MONOCYTES # BLD: 0.5 K/UL (ref 0–1)
MONOCYTES NFR BLD: 8 % (ref 5–13)
NEUTS SEG # BLD: 4.1 K/UL (ref 1.8–8)
NEUTS SEG NFR BLD: 63 % (ref 32–75)
NRBC # BLD: 0 K/UL (ref 0–0.01)
NRBC BLD-RTO: 0 PER 100 WBC
PLATELET # BLD AUTO: 239 K/UL (ref 150–400)
PMV BLD AUTO: 10.3 FL (ref 8.9–12.9)
POTASSIUM SERPL-SCNC: 4.4 MMOL/L (ref 3.5–5.1)
PROT SERPL-MCNC: 6.4 G/DL (ref 6.4–8.2)
RBC # BLD AUTO: 3.8 M/UL (ref 4.1–5.7)
SODIUM SERPL-SCNC: 141 MMOL/L (ref 136–145)
WBC # BLD AUTO: 6.6 K/UL (ref 4.1–11.1)

## 2024-07-23 ENCOUNTER — TELEPHONE (OUTPATIENT)
Age: 74
End: 2024-07-23

## 2024-07-23 NOTE — TELEPHONE ENCOUNTER
Called to reschedule due to being on cancellation list, PT first stated that the appointment was supposed to be cancelled and I informed it was not. PT stated that he was unsure when he made the appointment and I informed him with the information per his chart I asked PT if he wanted to cancel and he requested for the appt to stay the same.

## 2024-08-06 ENCOUNTER — OFFICE VISIT (OUTPATIENT)
Age: 74
End: 2024-08-06
Payer: MEDICARE

## 2024-08-06 VITALS
WEIGHT: 193.4 LBS | DIASTOLIC BLOOD PRESSURE: 67 MMHG | HEIGHT: 66 IN | HEART RATE: 66 BPM | OXYGEN SATURATION: 93 % | TEMPERATURE: 98 F | BODY MASS INDEX: 31.08 KG/M2 | SYSTOLIC BLOOD PRESSURE: 122 MMHG

## 2024-08-06 DIAGNOSIS — L40.9 PSORIASIS: Primary | ICD-10-CM

## 2024-08-06 DIAGNOSIS — E10.69 TYPE 1 DIABETES MELLITUS WITH OTHER SPECIFIED COMPLICATION (HCC): ICD-10-CM

## 2024-08-06 DIAGNOSIS — R25.1 TREMOR: ICD-10-CM

## 2024-08-06 DIAGNOSIS — R41.3 MEMORY LOSS: ICD-10-CM

## 2024-08-06 DIAGNOSIS — E16.2 MULTIPLE EPISODES OF HYPOGLYCEMIA: ICD-10-CM

## 2024-08-06 PROCEDURE — 99213 OFFICE O/P EST LOW 20 MIN: CPT | Performed by: STUDENT IN AN ORGANIZED HEALTH CARE EDUCATION/TRAINING PROGRAM

## 2024-08-06 PROCEDURE — 3051F HG A1C>EQUAL 7.0%<8.0%: CPT | Performed by: STUDENT IN AN ORGANIZED HEALTH CARE EDUCATION/TRAINING PROGRAM

## 2024-08-06 PROCEDURE — 1123F ACP DISCUSS/DSCN MKR DOCD: CPT | Performed by: STUDENT IN AN ORGANIZED HEALTH CARE EDUCATION/TRAINING PROGRAM

## 2024-08-06 RX ORDER — GUSELKUMAB 100 MG/ML
INJECTION SUBCUTANEOUS
COMMUNITY

## 2024-08-06 NOTE — PROGRESS NOTES
Deric Mabry is a 74 y.o. male      Chief Complaint   Patient presents with    Rectal Bleeding     1 month follow up.  No appetite, shaking of hands, losing weight, decreased short term memory.   Colonoscopy a week ago-took biopsies, removed 2 polyps and couldn't remove one.       \"Have you been to the ER, urgent care clinic since your last visit?  Hospitalized since your last visit?\"    NO    “Have you seen or consulted any other health care providers outside of [facility] since your last visit?”    NO          Click Here for Release of Records Request    Vitals:    08/06/24 1445   BP: 122/67   Site: Left Upper Arm   Position: Sitting   Cuff Size: Medium Adult   Pulse: 66   Temp: 98 °F (36.7 °C)   TempSrc: Oral   SpO2: 93%   Weight: 87.7 kg (193 lb 6.4 oz)   Height: 1.676 m (5' 5.98\")           Medication Reconciliation Completed, changes notes. Please Update medication list.  
when subsequent blood tests - monitoring       Current Medications/Allergies (REVIEWED):     Current Outpatient Medications on File Prior to Visit   Medication Sig Dispense Refill    Guselkumab (TREMFYA) 100 MG/ML SOPN Inject into the skin      Continuous Glucose Sensor (DEXCOM G7 SENSOR) MISC 1 EACH SUBCUTANEOUS EVERY 10 DAYS for 30 days      nicotine (NICODERM CQ) 14 MG/24HR Place 1 patch onto the skin every 24 hours      albuterol sulfate HFA (PROVENTIL;VENTOLIN;PROAIR) 108 (90 Base) MCG/ACT inhaler Inhale 2 puffs into the lungs every 4 hours as needed      oxyBUTYnin (DITROPAN-XL) 10 MG extended release tablet Take 1 tablet by mouth daily      DULoxetine (CYMBALTA) 60 MG extended release capsule Take 2 capsules by mouth daily 90 capsule 3    amLODIPine (NORVASC) 10 MG tablet Take 1 tablet by mouth daily 90 tablet 3    aspirin 81 MG EC tablet Take 1 tablet by mouth daily 90 tablet 3    econazole nitrate 1 % cream Apply topically daily Apply topically daily. 30 g 2    lisinopril (PRINIVIL;ZESTRIL) 40 MG tablet Take 1 tablet by mouth daily 90 tablet 2    rosuvastatin (CRESTOR) 20 MG tablet Take 1 tablet by mouth daily 60 tablet 3    clobetasol propionate 0.05 % LOTN lotion Apply topically 2 times daily      traZODone (DESYREL) 50 MG tablet Take 1 tablet by mouth nightly      tadalafil (CIALIS) 20 MG tablet Take 1 tablet by mouth as needed for Erectile Dysfunction      calcium carbonate 600 MG TABS tablet Take 1 tablet by mouth daily      Cholecalciferol (VITAMIN D3) 50 MCG (2000 UT) CAPS Take 1 capsule by mouth daily      Probiotic Product (PROBIOTIC-10) CHEW Take by mouth      INSULIN DEGLUDEC FLEXTOUCH SC Inject 28 Units into the skin daily      ascorbic acid (VITAMIN C) 100 MG tablet Take 5 tablets by mouth daily      insulin lispro, 1 Unit Dial, (HUMALOG KWIKPEN) 100 UNIT/ML SOPN Inject into the skin      gabapentin (NEURONTIN) 300 MG capsule Take 4 capsules by mouth 3 times daily for 60 days. Max Daily Amount:

## 2024-08-14 DIAGNOSIS — M05.9 RHEUMATOID ARTHRITIS WITH POSITIVE RHEUMATOID FACTOR, INVOLVING UNSPECIFIED SITE (HCC): ICD-10-CM

## 2024-08-14 NOTE — TELEPHONE ENCOUNTER
Medication Refill Request    Deric Mabry is requesting a refill of the following medication(s):   Requested Prescriptions     Pending Prescriptions Disp Refills    gabapentin (NEURONTIN) 300 MG capsule 360 capsule 1     Sig: Take 4 capsules by mouth 3 times daily for 60 days. Max Daily Amount: 3,600 mg        Listed PCP is Joby Leigh MD   Last provider to prescribe medication: Reese  Last Date of Medication Prescribed: 6/5/2024   Date of Last Office Visit at [facility]: 8/6/2024   FUTURE APPOINTMENT:   Future Appointments   Date Time Provider Department Center   9/3/2024  1:40 PM Joby Leigh MD [facility]CC DEP   10/15/2024  1:00 PM Myrtle Benjamin, APRN - NP AO BS AMB       Please send refill to:    [facility] [id number] - [place], [place] - [address] [phone] - F [phone]  [address]  [facility] [address]  Phone: [phone] Fax: [phone]      Please review request and approve or deny with recommendations.

## 2024-08-15 RX ORDER — GABAPENTIN 300 MG/1
600 CAPSULE ORAL 3 TIMES DAILY
Qty: 180 CAPSULE | Refills: 1 | Status: SHIPPED | OUTPATIENT
Start: 2024-08-15 | End: 2024-10-14

## 2024-08-16 DIAGNOSIS — M05.9 RHEUMATOID ARTHRITIS WITH POSITIVE RHEUMATOID FACTOR, INVOLVING UNSPECIFIED SITE (HCC): ICD-10-CM

## 2024-08-16 RX ORDER — GABAPENTIN 300 MG/1
600 CAPSULE ORAL 3 TIMES DAILY
Qty: 180 CAPSULE | Refills: 1 | OUTPATIENT
Start: 2024-08-16 | End: 2024-10-15

## 2024-08-16 NOTE — TELEPHONE ENCOUNTER
Already filled yesterday.    Joby Leigh MD, MPH  Formerly named [facility]

## 2024-08-16 NOTE — TELEPHONE ENCOUNTER
Medication Refill Request    Deric Mabry is requesting a refill of the following medication(s):   Requested Prescriptions     Pending Prescriptions Disp Refills    gabapentin (NEURONTIN) 300 MG capsule 180 capsule 1     Sig: Take 2 capsules by mouth 3 times daily for 60 days. Max Daily Amount: 1,800 mg        Listed PCP is Joby Leigh MD   Last provider to prescribe medication: Reese  Last Date of Medication Prescribed: 8/15/2024   Date of Last Office Visit at [facility]: 8/6/2024   FUTURE APPOINTMENT:   Future Appointments   Date Time Provider Department Center   9/3/2024  1:40 PM Joby Leigh MD [facility] ECC DEP   10/15/2024  1:00 PM Myrtle Benjamin, APRN - NP AO BS AMB       Please send refill to:    [facility] [id number] - [place], [place] - [address] [phone] - F [phone]  [address]  [facility] [address]  Phone: [phone] Fax: [phone]      Please review request and approve or deny with recommendations.

## 2024-08-16 NOTE — TELEPHONE ENCOUNTER
Patient called stating that his prescription was written incorrectly and needed it to be corrected. He stated that he takes 4 tablets 3 times daily, but the prescription was written for 2 tablets 3 times daily. Would like for prescription to be updated and sent to [address]    Thanks!

## 2024-09-03 ENCOUNTER — OFFICE VISIT (OUTPATIENT)
Age: 74
End: 2024-09-03
Payer: MEDICARE

## 2024-09-03 VITALS
HEART RATE: 65 BPM | BODY MASS INDEX: 30.92 KG/M2 | SYSTOLIC BLOOD PRESSURE: 131 MMHG | HEIGHT: 66 IN | OXYGEN SATURATION: 92 % | TEMPERATURE: 98.1 F | DIASTOLIC BLOOD PRESSURE: 82 MMHG | WEIGHT: 192.4 LBS

## 2024-09-03 DIAGNOSIS — F32.A DEPRESSION, UNSPECIFIED DEPRESSION TYPE: ICD-10-CM

## 2024-09-03 DIAGNOSIS — M05.9 RHEUMATOID ARTHRITIS WITH POSITIVE RHEUMATOID FACTOR, INVOLVING UNSPECIFIED SITE (HCC): ICD-10-CM

## 2024-09-03 DIAGNOSIS — L40.9 PSORIASIS: ICD-10-CM

## 2024-09-03 DIAGNOSIS — E10.69 TYPE 1 DIABETES MELLITUS WITH OTHER SPECIFIED COMPLICATION (HCC): ICD-10-CM

## 2024-09-03 DIAGNOSIS — K57.91 GASTROINTESTINAL HEMORRHAGE ASSOCIATED WITH INTESTINAL DIVERTICULOSIS: ICD-10-CM

## 2024-09-03 DIAGNOSIS — Z72.0 TOBACCO USE: ICD-10-CM

## 2024-09-03 DIAGNOSIS — I09.9: Primary | ICD-10-CM

## 2024-09-03 PROCEDURE — 99213 OFFICE O/P EST LOW 20 MIN: CPT | Performed by: STUDENT IN AN ORGANIZED HEALTH CARE EDUCATION/TRAINING PROGRAM

## 2024-09-03 PROCEDURE — 1123F ACP DISCUSS/DSCN MKR DOCD: CPT | Performed by: STUDENT IN AN ORGANIZED HEALTH CARE EDUCATION/TRAINING PROGRAM

## 2024-09-03 PROCEDURE — 3051F HG A1C>EQUAL 7.0%<8.0%: CPT | Performed by: STUDENT IN AN ORGANIZED HEALTH CARE EDUCATION/TRAINING PROGRAM

## 2024-09-03 RX ORDER — ROSUVASTATIN CALCIUM 20 MG/1
20 TABLET, COATED ORAL DAILY
Qty: 90 TABLET | Refills: 3 | Status: SHIPPED | OUTPATIENT
Start: 2024-09-03 | End: 2025-09-03

## 2024-09-03 RX ORDER — CLOBETASOL PROPIONATE 0.5 MG/G
CREAM TOPICAL
Qty: 60 G | Refills: 2 | Status: SHIPPED | OUTPATIENT
Start: 2024-09-03

## 2024-09-03 ASSESSMENT — PATIENT HEALTH QUESTIONNAIRE - PHQ9
SUM OF ALL RESPONSES TO PHQ9 QUESTIONS 1 & 2: 2
SUM OF ALL RESPONSES TO PHQ QUESTIONS 1-9: 2
1. LITTLE INTEREST OR PLEASURE IN DOING THINGS: SEVERAL DAYS
2. FEELING DOWN, DEPRESSED OR HOPELESS: SEVERAL DAYS

## 2024-09-03 NOTE — PROGRESS NOTES
Deric Mabry is a 74 y.o. male      Chief Complaint   Patient presents with    Follow-up     Appetitive improved, today finished antibiotics, started tremfia Thursday.  GI doctor put patient on 2 antibiotics -found during endoscopy.       \"Have you been to the ER, urgent care clinic since your last visit?  Hospitalized since your last visit?\"    NO    “Have you seen or consulted any other health care providers outside of [facility] since your last visit?”    NO          Click Here for Release of Records Request    Vitals:    09/03/24 1349   BP: 131/82   Site: Left Upper Arm   Position: Sitting   Cuff Size: Medium Adult   Pulse: 65   Temp: 98.1 °F (36.7 °C)   TempSrc: Oral   SpO2: 90%   Weight: 87.3 kg (192 lb 6.4 oz)   Height: 1.676 m (5' 5.98\")           Medication Reconciliation Completed, changes notes. Please Update medication list.

## 2024-09-05 NOTE — PROGRESS NOTES
[facility] Medicine Clinic Note      History of Present Illness:     Chief Complaint   Patient presents with    Follow-up     Appetitive improved, today finished antibiotics, started tremfia Thursday.  GI doctor put patient on 2 antibiotics -due to endoscopy result.       Deric Mabry is a 74 y.o. male with a PMH notable for RA, DJD of the spine, T1DM on insulin, HTN, Depression, psoriasis, and recent GI bleed 2/2 bleeding diverticula, underwent IR embolizations who presents for followup.    H. Pylori - confirmed by biopsy on recent endoscopy. Finishing treatment this week, minimal symptoms.    HTN - well controlled    Family stressors/Depression - states wife is doing better, sober now for a couple of weeks. Mood improved compared to last visit. Declines Rx, will think about family stress clinic.        PMH (REVIEWED):  Past Medical History:   Diagnosis Date    Adverse effect of anesthesia     kept pt in ICU longer after colon surgery d/t smoking    Arthritis     psoriatic    Chronic obstructive pulmonary disease (HCC)     Chronic pain     neck/back/legs/knees especially right/ankles/right hand/fingers    Diabetes (HCC)     type 1    Hypertension     Ill-defined condition     slight rotator cuff tears on both shoulders/injection in left shoulder    Ill-defined condition     DDD in back and neck - sciatic pain - hx endoscopic injections in back    Ill-defined condition     peripheral neuropathy    Ill-defined condition     increased cholesterol    Rheumatic fever     Thyroid disease     \"a little bit high\"/improving when subsequent blood tests - monitoring       Current Medications/Allergies (REVIEWED):     Current Outpatient Medications on File Prior to Visit   Medication Sig Dispense Refill    gabapentin (NEURONTIN) 300 MG capsule Take 4 capsules by mouth 3 times daily for 60 days. Max Daily Amount: 3,600 mg 360 capsule 1    Guselkumab (TREMFYA) 100 MG/ML SOPN Inject into the skin      Continuous

## 2024-10-16 ENCOUNTER — TELEPHONE (OUTPATIENT)
Age: 74
End: 2024-10-16

## 2024-10-16 NOTE — TELEPHONE ENCOUNTER
*PENDING REVIEW*, Received referral request, referral is pending review from provider will contact patient once we are given the ok to schedule New Patient [place].

## 2024-11-11 ENCOUNTER — TELEPHONE (OUTPATIENT)
Age: 74
End: 2024-11-11

## 2024-11-11 DIAGNOSIS — M05.9 RHEUMATOID ARTHRITIS WITH POSITIVE RHEUMATOID FACTOR, INVOLVING UNSPECIFIED SITE (HCC): ICD-10-CM

## 2024-11-11 RX ORDER — GABAPENTIN 300 MG/1
300 CAPSULE ORAL 3 TIMES DAILY
Qty: 90 CAPSULE | Refills: 1 | Status: SHIPPED | OUTPATIENT
Start: 2024-11-11 | End: 2024-11-12

## 2024-11-11 RX ORDER — LISINOPRIL 40 MG/1
40 TABLET ORAL DAILY
Qty: 90 TABLET | Refills: 2 | Status: SHIPPED | OUTPATIENT
Start: 2024-11-11

## 2024-11-11 NOTE — TELEPHONE ENCOUNTER
Pt is requesting a refill of gabapentin (NEURONTIN) 300 MG capsule to be sent [facility] [id number] - [facility] [address] [phone] - F [phone].

## 2024-11-11 NOTE — TELEPHONE ENCOUNTER
Patient called requesting a refill on Lisinopril 40 mg. Patient stated that he only has a few days left of the medication. Would like for prescription to be sent to [address]    Thanks!

## 2024-11-12 ENCOUNTER — TELEPHONE (OUTPATIENT)
Age: 74
End: 2024-11-12

## 2024-11-12 DIAGNOSIS — M05.9 RHEUMATOID ARTHRITIS WITH POSITIVE RHEUMATOID FACTOR, INVOLVING UNSPECIFIED SITE (HCC): ICD-10-CM

## 2024-11-12 RX ORDER — GABAPENTIN 300 MG/1
1200 CAPSULE ORAL 3 TIMES DAILY
Qty: 360 CAPSULE | Refills: 0 | Status: SHIPPED | OUTPATIENT
Start: 2024-11-12 | End: 2024-12-12

## 2024-11-12 NOTE — TELEPHONE ENCOUNTER
Pt called to inform you that the Rx that you placed for Gabapentin is incorrect. He stated that he takes \"4 capsules by mouth 3 times daily\" as previously prescribed. He is requesting that you correct the Rx asap.    Thank you

## 2024-11-12 NOTE — TELEPHONE ENCOUNTER
Spoke to patient per his request.  He wants his usual prescription to be filled, 12 capsules per day.  Prescription sent to pharmacy is not correct according to the patient.  I told the patient I would check with the doctor and all him back.

## 2024-11-13 ENCOUNTER — TELEPHONE (OUTPATIENT)
Age: 74
End: 2024-11-13

## 2024-11-13 NOTE — TELEPHONE ENCOUNTER
I called the patient last night at 5:15 PM to say that Dr. Leigh will call in a new prescription and that he needs to schedule an appointment with Dr. Leigh in 4 weeks.  The patient indicated that he would call back to schedule the appointment.

## 2024-11-22 RX ORDER — POTASSIUM CHLORIDE 750 MG/1
10 TABLET, EXTENDED RELEASE ORAL DAILY
COMMUNITY
End: 2024-11-22 | Stop reason: SDUPTHER

## 2024-11-22 RX ORDER — POTASSIUM CHLORIDE 750 MG/1
10 TABLET, EXTENDED RELEASE ORAL DAILY
Qty: 60 TABLET | Refills: 3 | Status: SHIPPED | OUTPATIENT
Start: 2024-11-22

## 2024-11-22 NOTE — TELEPHONE ENCOUNTER
Medication Refill Request    Deric Mabry is requesting a refill of the following medication(s):   Requested Prescriptions     Pending Prescriptions Disp Refills    potassium chloride (KLOR-CON) 10 MEQ extended release tablet 60 tablet      Sig: Take 1 tablet by mouth daily        Listed PCP is Joby Leigh MD   Last provider to prescribe medication: [facility] per Outside Medications  Last Date of Medication Prescribed: 8/27/24  Date of Last Office Visit at [facility]: 9/3/24   FUTURE APPOINTMENT:   Future Appointments   Date Time Provider Department Center   11/26/2024  1:00 PM Joby Leigh MD [facility] ECC DEP       Please send refill to:    [facility] [id number] - [place], [place] - [address] [phone] - F [phone]  [address]  [facility] [address]  Phone: [phone] Fax: [phone]      Please review request and approve or deny with recommendations.

## 2024-11-26 ENCOUNTER — OFFICE VISIT (OUTPATIENT)
Age: 74
End: 2024-11-26
Payer: MEDICARE

## 2024-11-26 VITALS
DIASTOLIC BLOOD PRESSURE: 60 MMHG | BODY MASS INDEX: 31.15 KG/M2 | HEIGHT: 66 IN | HEART RATE: 57 BPM | WEIGHT: 193.8 LBS | TEMPERATURE: 98.1 F | OXYGEN SATURATION: 92 % | SYSTOLIC BLOOD PRESSURE: 120 MMHG

## 2024-11-26 DIAGNOSIS — M54.32 LEFT SCIATIC NERVE PAIN: ICD-10-CM

## 2024-11-26 DIAGNOSIS — E10.69 TYPE 1 DIABETES MELLITUS WITH OTHER SPECIFIED COMPLICATION (HCC): Primary | ICD-10-CM

## 2024-11-26 DIAGNOSIS — N40.1 BENIGN PROSTATIC HYPERPLASIA WITH LOWER URINARY TRACT SYMPTOMS, SYMPTOM DETAILS UNSPECIFIED: ICD-10-CM

## 2024-11-26 DIAGNOSIS — Z72.0 TOBACCO USE: ICD-10-CM

## 2024-11-26 DIAGNOSIS — M05.9 RHEUMATOID ARTHRITIS WITH POSITIVE RHEUMATOID FACTOR, INVOLVING UNSPECIFIED SITE (HCC): ICD-10-CM

## 2024-11-26 DIAGNOSIS — K63.5 POLYP OF COLON, UNSPECIFIED PART OF COLON, UNSPECIFIED TYPE: ICD-10-CM

## 2024-11-26 PROCEDURE — 99213 OFFICE O/P EST LOW 20 MIN: CPT | Performed by: STUDENT IN AN ORGANIZED HEALTH CARE EDUCATION/TRAINING PROGRAM

## 2024-11-26 PROCEDURE — 1123F ACP DISCUSS/DSCN MKR DOCD: CPT | Performed by: STUDENT IN AN ORGANIZED HEALTH CARE EDUCATION/TRAINING PROGRAM

## 2024-11-26 PROCEDURE — 1159F MED LIST DOCD IN RCRD: CPT | Performed by: STUDENT IN AN ORGANIZED HEALTH CARE EDUCATION/TRAINING PROGRAM

## 2024-11-26 PROCEDURE — 3051F HG A1C>EQUAL 7.0%<8.0%: CPT | Performed by: STUDENT IN AN ORGANIZED HEALTH CARE EDUCATION/TRAINING PROGRAM

## 2024-11-26 RX ORDER — GABAPENTIN 300 MG/1
1200 CAPSULE ORAL 3 TIMES DAILY
Qty: 360 CAPSULE | Refills: 0 | Status: SHIPPED | OUTPATIENT
Start: 2024-11-26 | End: 2024-12-26

## 2024-11-26 RX ORDER — OXYBUTYNIN CHLORIDE 10 MG/1
10 TABLET, EXTENDED RELEASE ORAL DAILY
Qty: 90 TABLET | Refills: 3 | Status: SHIPPED | OUTPATIENT
Start: 2024-11-26 | End: 2025-11-26

## 2024-11-26 NOTE — PROGRESS NOTES
Deric Mabry is a 74 y.o. male      Chief Complaint   Patient presents with    Follow-up     3 month f/u.  States is stopping Tremfya because no longer helping Rheumatologist switching him to infusion.       \"Have you been to the ER, urgent care clinic since your last visit?  Hospitalized since your last visit?\"    NO    “Have you seen or consulted any other health care providers outside of [facility] since your last visit?”    NO          Click Here for Release of Records Request    Vitals:    11/26/24 1300   BP: 120/60   Site: Right Upper Arm   Position: Sitting   Cuff Size: Medium Adult   Pulse: 57   Temp: 98.1 °F (36.7 °C)   TempSrc: Oral   SpO2: 92%   Weight: 87.9 kg (193 lb 12.8 oz)   Height: 1.676 m (5' 5.98\")           Medication Reconciliation Completed, changes notes. Please Update medication list.

## 2024-11-26 NOTE — PROGRESS NOTES
[facility] Clinic Note      History of Present Illness:     Chief Complaint   Patient presents with    Follow-up     3 month f/u.  States is stopping Tremfya because no longer helping Rheumatologist switching him to infusion.       Deric Mabry is a 74 y.o. male with a PMH notable for RA, DJD of the spine, T1DM on insulin, HTN, Depression, psoriasis, and recent GI bleed 2/2 bleeding diverticula, underwent IR embolizations  who presents for followup.    #RA - follows w rhem, pain. At baseline.    #HTN - controlled on home meds.    #Sciatic nerve pain and DDD - follows w pain med, has appt in December, gets injections, s/p nerve ablation. Agrees to have them manage high dose of gabapentin.    #Hmx - had cscope 2 months ago. Large polyp could not be removed. Has surgery upcoming.      PMH (REVIEWED):  Past Medical History:   Diagnosis Date    Adverse effect of anesthesia     kept pt in ICU longer after colon surgery d/t smoking    Arthritis     psoriatic    Chronic obstructive pulmonary disease (HCC)     Chronic pain     neck/back/legs/knees especially right/ankles/right hand/fingers    Diabetes (HCC)     type 1    Hypertension     Ill-defined condition     slight rotator cuff tears on both shoulders/injection in left shoulder    Ill-defined condition     DDD in back and neck - sciatic pain - hx endoscopic injections in back    Ill-defined condition     peripheral neuropathy    Ill-defined condition     increased cholesterol    Rheumatic fever     Thyroid disease     \"a little bit high\"/improving when subsequent blood tests - monitoring       Current Medications/Allergies (REVIEWED):     Current Outpatient Medications on File Prior to Visit   Medication Sig Dispense Refill    potassium chloride (KLOR-CON) 10 MEQ extended release tablet Take 1 tablet by mouth daily 60 tablet 3    lisinopril (PRINIVIL;ZESTRIL) 40 MG tablet Take 1 tablet by mouth daily 90 tablet 2    rosuvastatin (CRESTOR) 20 MG

## 2024-12-04 RX ORDER — TRAZODONE HYDROCHLORIDE 50 MG/1
50 TABLET, FILM COATED ORAL NIGHTLY
Qty: 60 TABLET | Refills: 3 | Status: SHIPPED | OUTPATIENT
Start: 2024-12-04 | End: 2025-12-04

## 2024-12-04 NOTE — TELEPHONE ENCOUNTER
Medication Refill Request    Deric Mabry is requesting a refill of the following medication(s):   Requested Prescriptions     Pending Prescriptions Disp Refills    traZODone (DESYREL) 50 MG tablet       Sig: Take 1 tablet by mouth nightly        Listed PCP is Joby Leigh MD   Last provider to prescribe medication: n/a  Last Date of Medication Prescribed: n/a   Date of Last Office Visit at [facility]: 11/26/24   FUTURE APPOINTMENT:   Future Appointments   Date Time Provider Department Center   2/27/2025  1:00 PM Joby Leigh MD [facility] ECC DEP       Please send refill to:    [facility] [id number] - [place], [place] - [address] [phone] - F [phone]  [address]  [facility] [address]  Phone: [phone] Fax: [phone]      Please review request and approve or deny with recommendations.

## 2024-12-04 NOTE — TELEPHONE ENCOUNTER
Patient called requesting a refill on trazodone 50 mg. Patient stated that he only has about 3 days worth of tablets left. Would like for prescription to be sent to [address]    Thanks!

## 2025-01-02 RX ORDER — AMLODIPINE BESYLATE 10 MG/1
10 TABLET ORAL DAILY
Qty: 90 TABLET | Refills: 3 | Status: SHIPPED | OUTPATIENT
Start: 2025-01-02

## 2025-01-08 RX ORDER — ECONAZOLE NITRATE 10 MG/G
CREAM TOPICAL DAILY
Qty: 30 G | Refills: 2 | Status: SHIPPED | OUTPATIENT
Start: 2025-01-08

## 2025-01-08 NOTE — TELEPHONE ENCOUNTER
Medication Refill Request    Deric Mabry is requesting a refill of the following medication(s):   Requested Prescriptions     Pending Prescriptions Disp Refills    econazole nitrate 1 % cream 30 g 2     Sig: Apply topically daily Apply topically daily.        Listed PCP is Joby Leigh MD   Last provider to prescribe medication: Reese  Last Date of Medication Prescribed: 1/4/24   Date of Last Office Visit at [facility]: 11/26/24   FUTURE APPOINTMENT:   Future Appointments   Date Time Provider Department Center   2/27/2025  1:00 PM Joby Leigh MD [facility] ECC DEP       Please send refill to:    [facility] [id number] - [place], [place] - [address] [phone] - F [phone]  [address]  [facility] [address]  Phone: [phone] Fax: [phone]      Please review request and approve or deny with recommendations.

## 2025-01-28 ENCOUNTER — OFFICE VISIT (OUTPATIENT)
Age: 75
End: 2025-01-28
Payer: MEDICARE

## 2025-01-28 VITALS
HEART RATE: 61 BPM | HEIGHT: 66 IN | DIASTOLIC BLOOD PRESSURE: 60 MMHG | WEIGHT: 190 LBS | SYSTOLIC BLOOD PRESSURE: 102 MMHG | OXYGEN SATURATION: 92 % | BODY MASS INDEX: 30.53 KG/M2 | TEMPERATURE: 97.4 F

## 2025-01-28 DIAGNOSIS — J41.0 SIMPLE CHRONIC BRONCHITIS (HCC): ICD-10-CM
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
## 2025-01-28 DIAGNOSIS — J18.9 HOSPITAL-ACQUIRED PNEUMONIA: Primary | ICD-10-CM

## 2025-01-28 DIAGNOSIS — M05.9 RHEUMATOID ARTHRITIS WITH POSITIVE RHEUMATOID FACTOR, INVOLVING UNSPECIFIED SITE (HCC): ICD-10-CM

## 2025-01-28 DIAGNOSIS — E10.69 TYPE 1 DIABETES MELLITUS WITH OTHER SPECIFIED COMPLICATION (HCC): ICD-10-CM

## 2025-01-28 PROCEDURE — 1159F MED LIST DOCD IN RCRD: CPT | Performed by: STUDENT IN AN ORGANIZED HEALTH CARE EDUCATION/TRAINING PROGRAM

## 2025-01-28 PROCEDURE — 99213 OFFICE O/P EST LOW 20 MIN: CPT | Performed by: STUDENT IN AN ORGANIZED HEALTH CARE EDUCATION/TRAINING PROGRAM

## 2025-01-28 PROCEDURE — 1123F ACP DISCUSS/DSCN MKR DOCD: CPT | Performed by: STUDENT IN AN ORGANIZED HEALTH CARE EDUCATION/TRAINING PROGRAM

## 2025-01-28 RX ORDER — AMIODARONE HYDROCHLORIDE 200 MG/1
200 TABLET ORAL 3 TIMES DAILY
COMMUNITY

## 2025-01-28 RX ORDER — TRAZODONE HYDROCHLORIDE 50 MG/1
100 TABLET, FILM COATED ORAL NIGHTLY
Qty: 60 TABLET | Refills: 3 | Status: SHIPPED | OUTPATIENT
Start: 2025-01-28 | End: 2026-01-28

## 2025-01-28 NOTE — PROGRESS NOTES
Deric Mabry is a 74 y.o. male      Chief Complaint   Patient presents with    Follow-Up from Hospital     1/15/25 [facility] for colon polyp surgery, Tachycardia in the 140s then developed Pneumonia.         \"Have you been to the ER, urgent care clinic since your last visit?  Hospitalized since your last visit?\"    NO    “Have you seen or consulted any other health care providers outside of [facility] since your last visit?”    NO      Date of last Colonoscopy: 12/9/2019  No cologuard on file  No FIT/FOBT on file   No flexible sigmoidoscopy on file         Click Here for Release of Records Request    Vitals:    01/28/25 1426   BP: 102/60   Site: Right Upper Arm   Position: Sitting   Cuff Size: Large Adult   Pulse: 61   Temp: 97.4 °F (36.3 °C)   TempSrc: Oral   SpO2: 92%   Weight: 86.2 kg (190 lb)   Height: 1.676 m (5' 5.98\")           Medication Reconciliation Completed, changes notes. Please Update medication list.

## 2025-01-28 NOTE — PROGRESS NOTES
[facility] Clinic Note      History of Present Illness:     Chief Complaint   Patient presents with    Follow-Up from Hospital     1/15/25 [facility] for colon polyp surgery, Tachycardia in the 140s then developed Pneumonia.         Deric Mabry is a 74 y.o. male with a PMH notable for RA, DJD of the spine, T1DM on insulin, HTN, Depression, psoriasis, and recent GI bleed 2/2 bleeding diverticula, underwent IR embolizations who presents for followup.    #Hospital followup - was admitted to [facility] for 12 days from 1/15 until 1/27 (yesterday) for colon polyp removal that became complicated, required ex-lap, partial colon resection with reanastomosis, hospitalization complicated by atrial fibrillation which required cardioversion, and then pneumonia postoperatively, required oxygen support.     #PNA - finishing Augmentin soon, doing better. Was sent home wi home oxygen but has not gotten this yet. Feels symptoms are improved, satting well in clinic. Working on getting a pulse ox.    #Tachy arrhythmia - was sent home with amiodarone and eliquis, has cards ref but has not made an appointment yet. Eliquis was almost $300 so he did not pick this up. Wants to wait to talk to cardiology.    PMH (REVIEWED):  Past Medical History:   Diagnosis Date    Adverse effect of anesthesia     kept pt in ICU longer after colon surgery d/t smoking    Arthritis     psoriatic    Chronic obstructive pulmonary disease (HCC)     Chronic pain     neck/back/legs/knees especially right/ankles/right hand/fingers    Diabetes (HCC)     type 1    Hypertension     Ill-defined condition     slight rotator cuff tears on both shoulders/injection in left shoulder    Ill-defined condition     DDD in back and neck - sciatic pain - hx endoscopic injections in back    Ill-defined condition     peripheral neuropathy    Ill-defined condition     increased cholesterol    Rheumatic fever     Thyroid disease     \"a little bit

## 2025-02-18 NOTE — TELEPHONE ENCOUNTER
Medication Refill Request    Deric Mabry is requesting a refill of the following medication(s):   Requested Prescriptions     Pending Prescriptions Disp Refills    DULoxetine (CYMBALTA) 60 MG extended release capsule 90 capsule 3     Sig: Take 2 capsules by mouth daily        Listed PCP is Joby Leigh MD   Last provider to prescribe medication: Dr. Leigh  Last Date of Medication Prescribed: 01/04/24   Date of Last Office Visit at [facility]: 01/28/25  FUTURE APPOINTMENT:   Future Appointments   Date Time Provider Department Center   2/27/2025  1:00 PM Joby Leigh MD [facility] ECC DEP       Please send refill to:    [facility] [id number] - [place], [place] - [address] [phone] - F [phone]  [address]  [facility] [address]  Phone: [phone] Fax: [phone]      Please review request and approve or deny with recommendations.

## 2025-02-19 RX ORDER — DULOXETIN HYDROCHLORIDE 60 MG/1
120 CAPSULE, DELAYED RELEASE ORAL DAILY
Qty: 90 CAPSULE | Refills: 3 | Status: SHIPPED | OUTPATIENT
Start: 2025-02-19

## 2025-02-20 ENCOUNTER — TELEPHONE (OUTPATIENT)
Age: 75
End: 2025-02-20

## 2025-02-20 NOTE — TELEPHONE ENCOUNTER
----- Message from Johnathon DORINA sent at 2/20/2025  2:12 PM EST -----  Regarding: ECC Escalation To Practice  ECC Escalation To Practice      Type of Escalation: Red Flag Symptom  --------------------------------------------------------------------------------------------------------------------------    Information for Provider:  Joby Leigh MD  Patient is looking for appointment for: Symptom Difficulty of breathing  Reasons for Message: Unable to reach practice     Additional Information: Patient been hospitalized last 2/12 and had some complications and he has a problem with breathing. He is looking for a pulmonologist as per advised.    --------------------------------------------------------------------------------------------------------------------------    Relationship to Patient: Self  Call Back Info: OK to leave message on [employer]  Preferred Call Back Number: Phone  [phone]

## 2025-02-21 ENCOUNTER — TELEPHONE (OUTPATIENT)
Age: 75
End: 2025-02-21

## 2025-02-21 NOTE — TELEPHONE ENCOUNTER
Received message from [facility] stating patient had called with concerns related to SOB. I called patient, verified name and date of birth. When I asked patient about SOB he stated he didn't have any SOB, or other health concerns he wanted a referral to Pulmonology within [facility]. He said he had a pulmonologist several years ago but no longer has a relationship with that practice. I will work with Dr. Leigh's nurse to get the referral placed.    9:53 am Spoke with patient again, let him know Dr. Leigh will review the message and determine if the referral is appropriate. I told him that office should call to schedule the appt. If he has not heard from Pulmonology he can call the office back to see if referral has been placed.

## 2025-02-27 ENCOUNTER — OFFICE VISIT (OUTPATIENT)
Age: 75
End: 2025-02-27
Payer: MEDICARE

## 2025-02-27 VITALS
WEIGHT: 191.2 LBS | OXYGEN SATURATION: 90 % | BODY MASS INDEX: 30.73 KG/M2 | TEMPERATURE: 97.5 F | SYSTOLIC BLOOD PRESSURE: 127 MMHG | DIASTOLIC BLOOD PRESSURE: 59 MMHG | HEIGHT: 66 IN | HEART RATE: 52 BPM

## 2025-02-27 DIAGNOSIS — Z13.6 SCREENING FOR CARDIOVASCULAR CONDITION: ICD-10-CM

## 2025-02-27 DIAGNOSIS — C18.4 MALIGNANT NEOPLASM OF TRANSVERSE COLON (HCC): ICD-10-CM

## 2025-02-27 DIAGNOSIS — Z00.00 MEDICARE ANNUAL WELLNESS VISIT, SUBSEQUENT: Primary | ICD-10-CM

## 2025-02-27 PROCEDURE — 1123F ACP DISCUSS/DSCN MKR DOCD: CPT | Performed by: STUDENT IN AN ORGANIZED HEALTH CARE EDUCATION/TRAINING PROGRAM

## 2025-02-27 PROCEDURE — 1160F RVW MEDS BY RX/DR IN RCRD: CPT | Performed by: STUDENT IN AN ORGANIZED HEALTH CARE EDUCATION/TRAINING PROGRAM

## 2025-02-27 PROCEDURE — G0439 PPPS, SUBSEQ VISIT: HCPCS | Performed by: STUDENT IN AN ORGANIZED HEALTH CARE EDUCATION/TRAINING PROGRAM

## 2025-02-27 PROCEDURE — 1159F MED LIST DOCD IN RCRD: CPT | Performed by: STUDENT IN AN ORGANIZED HEALTH CARE EDUCATION/TRAINING PROGRAM

## 2025-02-27 ASSESSMENT — LIFESTYLE VARIABLES
HOW OFTEN DO YOU HAVE A DRINK CONTAINING ALCOHOL: MONTHLY OR LESS
HOW MANY STANDARD DRINKS CONTAINING ALCOHOL DO YOU HAVE ON A TYPICAL DAY: 3 OR 4

## 2025-02-27 ASSESSMENT — PATIENT HEALTH QUESTIONNAIRE - PHQ9
SUM OF ALL RESPONSES TO PHQ QUESTIONS 1-9: 2
SUM OF ALL RESPONSES TO PHQ QUESTIONS 1-9: 2
SUM OF ALL RESPONSES TO PHQ9 QUESTIONS 1 & 2: 2
2. FEELING DOWN, DEPRESSED OR HOPELESS: SEVERAL DAYS
1. LITTLE INTEREST OR PLEASURE IN DOING THINGS: SEVERAL DAYS
SUM OF ALL RESPONSES TO PHQ QUESTIONS 1-9: 2
SUM OF ALL RESPONSES TO PHQ QUESTIONS 1-9: 2

## 2025-02-27 NOTE — PATIENT INSTRUCTIONS
Plan for Deric Mabry - 2/27/2025  Medicare offers a range of preventive health benefits. Some of the tests and screenings are paid in full while other may be subject to a deductible, co-insurance, and/or copay.  Some of these benefits include a comprehensive review of your medical history including lifestyle, illnesses that may run in your family, and various assessments and screenings as appropriate.  After reviewing your medical record and screening and assessments performed today your provider may have ordered immunizations, labs, imaging, and/or referrals for you.  A list of these orders (if applicable) as well as your Preventive Care list are included within your After Visit Summary for your review.

## 2025-02-27 NOTE — PROGRESS NOTES
Deric Mabry is a 74 y.o. male      Chief Complaint   Patient presents with    Medicare AWV     Colonoscopy -removed polyp came back cancerous (stage 1) had removed January 2025.       \"Have you been to the ER, urgent care clinic since your last visit?  Hospitalized since your last visit?\"    NO    “Have you seen or consulted any other health care providers outside of [facility] since your last visit?”    YES - When: approximately 6  weeks ago.  Where and Why: [facility] for colonoscopy with polyp removal.      “Have you had a colorectal cancer screening such as a colonoscopy/FIT/Cologuard?    YES - Type: [facility] - [facility] Nurse/CMA to request most recent records if not in the chart     Date of last Colonoscopy: 12/9/2019  No cologuard on file  No FIT/FOBT on file   No flexible sigmoidoscopy on file         Click Here for Release of Records Request    Vitals:    02/27/25 1302   BP: (!) 147/63   Site: Left Upper Arm   Position: Sitting   Cuff Size: Medium Adult   Pulse: 52   Temp: 97.5 °F (36.4 °C)   TempSrc: Oral   SpO2: 90%   Weight: 86.7 kg (191 lb 3.2 oz)   Height: 1.676 m (5' 5.98\")           Medication Reconciliation Completed, changes notes. Please Update medication list.

## 2025-02-27 NOTE — PROGRESS NOTES
Medicare Annual Wellness Visit    Deric Mabry is here for Medicare AWV (Colonoscopy -removed polyp came back cancerous (stage 1) had removed January 2025.)    Assessment & Plan   Assessment & Plan  Screening for cardiovascular condition       Orders:    Lipid Panel; Future    Lipid Panel    Medicare annual wellness visit, subsequent       Orders:    CBC; Future    Comprehensive Metabolic Panel; Future    Hemoglobin A1C; Future    Albumin/Creatinine Ratio, Urine; Future    Malignant neoplasm of transverse colon (HCC)               Return in 6 months (on 8/27/2025).     Subjective       Patient's complete Health Risk Assessment and screening values have been reviewed and are found in Flowsheets. The following problems were reviewed today and where indicated follow up appointments were made and/or referrals ordered.    Positive Risk Factor Screenings with Interventions:                Abnormal BMI (obese):  Body mass index is 30.88 kg/m². (!) Abnormal  Interventions:  See AVS for additional education material         Hearing Screen:  Do you or your family notice any trouble with your hearing that hasn't been managed with hearing aids?: (!) Yes  Vision Screen:  Do you have difficulty driving, watching TV, or doing any of your daily activities because of your eyesight?: No  Have you had an eye exam within the past year?: (!) No                    Objective   Vitals:    02/27/25 1302 02/27/25 1347   BP: (!) 147/63 (!) 127/59   Site: Left Upper Arm    Position: Sitting    Cuff Size: Medium Adult    Pulse: 52    Temp: 97.5 °F (36.4 °C)    TempSrc: Oral    SpO2: 90%    Weight: 86.7 kg (191 lb 3.2 oz)    Height: 1.676 m (5' 5.98\")       Body mass index is 30.88 kg/m².                  No Known Allergies  Prior to Visit Medications    Medication Sig Taking? Authorizing Provider   DULoxetine (CYMBALTA) 60 MG extended release capsule Take 2 capsules by mouth daily Yes Joby Leigh MD   amiodarone (CORDARONE) 200 MG

## 2025-02-28 LAB
CHOLEST SERPL-MCNC: 143 MG/DL (ref 100–199)
HDLC SERPL-MCNC: 65 MG/DL
IMP & REVIEW OF LAB RESULTS: NORMAL
LDLC SERPL CALC-MCNC: 57 MG/DL (ref 0–99)
TRIGL SERPL-MCNC: 123 MG/DL (ref 0–149)
VLDLC SERPL CALC-MCNC: 21 MG/DL (ref 5–40)

## 2025-04-07 NOTE — TELEPHONE ENCOUNTER
Hi. Dr. Leigh,       Received a faxed refill request for medication(s):        econazole nitrate 1 % cream     Please send to:       [facility] [id number] - [place], [place] - [address] [phone] - F [phone] [address]  [facility] [address]  Phone: [phone] Fax: [phone]       Fax copy attached to this message. If an appointment is needed please send to YOUR PSR.     Thank you.

## 2025-04-08 RX ORDER — ECONAZOLE NITRATE 10 MG/G
CREAM TOPICAL DAILY
Qty: 30 G | Refills: 2 | Status: SHIPPED | OUTPATIENT
Start: 2025-04-08

## 2025-04-08 NOTE — TELEPHONE ENCOUNTER
Medication Refill Request    Deric Mabry is requesting a refill of the following medication(s):   Requested Prescriptions     Pending Prescriptions Disp Refills    econazole nitrate 1 % cream 30 g 2     Sig: Apply topically daily Apply topically daily.        Listed PCP is Joby Leigh MD   Last provider to prescribe medication: Reese  Last Date of Medication Prescribed: 1/8/25   Date of Last Office Visit at [facility]: 2/27/25   FUTURE APPOINTMENT: No future appointments.    Please send refill to:    [facility] [id number] - [place], [place] - [address] [phone] - F [phone]  [address]  [facility] [address]  Phone: [phone] Fax: [phone]      Please review request and approve or deny with recommendations.

## 2025-05-27 NOTE — TELEPHONE ENCOUNTER
Medication Refill Request    Deric Mabry is requesting a refill of the following medication(s):   Requested Prescriptions     Pending Prescriptions Disp Refills    traZODone (DESYREL) 50 MG tablet 60 tablet 3     Sig: Take 2 tablets by mouth nightly        Listed PCP is Joby Leigh MD   Last provider to prescribe medication: Reese  Last Date of Medication Prescribed: 1/28/25   Date of Last Office Visit at [facility]: 2/27/25   FUTURE APPOINTMENT: No future appointments.    Please send refill to:    [facility] [id number] - [place], [place] - [address] [phone] - F [phone]  [address]  [facility] [address]  Phone: [phone] Fax: [phone]      Please review request and approve or deny with recommendations.

## 2025-05-28 RX ORDER — TRAZODONE HYDROCHLORIDE 50 MG/1
100 TABLET ORAL NIGHTLY
Qty: 60 TABLET | Refills: 3 | Status: SHIPPED | OUTPATIENT
Start: 2025-05-28 | End: 2026-05-28

## 2025-06-12 ENCOUNTER — PATIENT MESSAGE (OUTPATIENT)
Age: 75
End: 2025-06-12

## 2025-07-23 ENCOUNTER — OFFICE VISIT (OUTPATIENT)
Age: 75
End: 2025-07-23
Payer: MEDICARE

## 2025-07-23 VITALS
SYSTOLIC BLOOD PRESSURE: 126 MMHG | HEIGHT: 66 IN | BODY MASS INDEX: 28.61 KG/M2 | OXYGEN SATURATION: 90 % | HEART RATE: 56 BPM | DIASTOLIC BLOOD PRESSURE: 67 MMHG | WEIGHT: 178 LBS | TEMPERATURE: 98.2 F

## 2025-07-23 DIAGNOSIS — I09.9: ICD-10-CM

## 2025-07-23 DIAGNOSIS — E10.69 TYPE 1 DIABETES MELLITUS WITH OTHER SPECIFIED COMPLICATION (HCC): ICD-10-CM

## 2025-07-23 DIAGNOSIS — I10 PRIMARY HYPERTENSION: ICD-10-CM

## 2025-07-23 DIAGNOSIS — E11.649 HYPOGLYCEMIC EPISODE IN PATIENT WITH DIABETES MELLITUS (HCC): ICD-10-CM

## 2025-07-23 DIAGNOSIS — R53.81 PHYSICAL DECONDITIONING: Primary | ICD-10-CM

## 2025-07-23 PROCEDURE — 99214 OFFICE O/P EST MOD 30 MIN: CPT | Performed by: STUDENT IN AN ORGANIZED HEALTH CARE EDUCATION/TRAINING PROGRAM

## 2025-07-23 RX ORDER — HYDROCHLOROTHIAZIDE 25 MG/1
25 TABLET ORAL DAILY
COMMUNITY

## 2025-07-23 RX ORDER — FUROSEMIDE 40 MG/1
40 TABLET ORAL DAILY
COMMUNITY

## 2025-07-23 NOTE — PROGRESS NOTES
Deric Mabry is a 75 y.o. male      Chief Complaint   Patient presents with    Follow-up       \"Have you been to the ER, urgent care clinic since your last visit?  Hospitalized since your last visit?\"    NO    “Have you seen or consulted any other health care providers outside of [facility] since your last visit?”    NO      “Have you had a colorectal cancer screening such as a colonoscopy/FIT/Cologuard?    YES - Type: Colonoscopy -Nurse/CMA to request most recent records if not in the chart     Date of last Colonoscopy: 12/9/2019  No cologuard on file  No FIT/FOBT on file   No flexible sigmoidoscopy on file         Click Here for Release of Records Request    Vitals:    07/23/25 1422   BP: 126/67   BP Site: Right Upper Arm   Patient Position: Sitting   BP Cuff Size: Medium Adult   Pulse: 56   Temp: 98.2 °F (36.8 °C)   TempSrc: Oral   SpO2: 90%   Weight: 80.7 kg (178 lb)   Height: 1.676 m (5' 5.98\")           Medication Reconciliation Completed, changes notes. Please Update medication list.

## 2025-07-23 NOTE — PROGRESS NOTES
[facility] Clinic Note      History of Present Illness:     Chief Complaint   Patient presents with    Follow-up       Deric Mabry is a 75 y.o. male with a PMH notable for RA, DJD of the spine, T1DM on insulin, HTN, Depression, psoriasis, GI bleed 2/2 bleeding diverticula s/p IR embolizations  who presents for followup.    #hypoglycemic episode - while in clinic, BG dropped to 60's per CGM. +symptoms. Was given orange juice x2 with improved symptoms.       PMH (REVIEWED):  Past Medical History:   Diagnosis Date    Adverse effect of anesthesia     kept pt in ICU longer after colon surgery d/t smoking    Arthritis     psoriatic    Chronic obstructive pulmonary disease (HCC)     Chronic pain     neck/back/legs/knees especially right/ankles/right hand/fingers    Diabetes (HCC)     type 1    Hypertension     Ill-defined condition     slight rotator cuff tears on both shoulders/injection in left shoulder    Ill-defined condition     DDD in back and neck - sciatic pain - hx endoscopic injections in back    Ill-defined condition     peripheral neuropathy    Ill-defined condition     increased cholesterol    Rheumatic fever     Thyroid disease     \"a little bit high\"/improving when subsequent blood tests - monitoring       Current Medications/Allergies (REVIEWED):     Current Outpatient Medications on File Prior to Visit   Medication Sig Dispense Refill    furosemide (LASIX) 40 MG tablet Take 1 tablet by mouth daily      hydroCHLOROthiazide (HYDRODIURIL) 25 MG tablet Take 1 tablet by mouth daily      traZODone (DESYREL) 50 MG tablet Take 2 tablets by mouth nightly 60 tablet 3    econazole nitrate 1 % cream Apply topically daily Apply topically daily. 30 g 2    DULoxetine (CYMBALTA) 60 MG extended release capsule Take 2 capsules by mouth daily 90 capsule 3    amiodarone (CORDARONE) 200 MG tablet Take 1 tablet by mouth 3 times daily      oxyBUTYnin (DITROPAN-XL) 10 MG extended release tablet Take 1

## 2025-07-24 ENCOUNTER — RESULTS FOLLOW-UP (OUTPATIENT)
Age: 75
End: 2025-07-24

## 2025-07-24 LAB
ALBUMIN SERPL-MCNC: 3.7 G/DL (ref 3.8–4.8)
ALBUMIN/CREAT UR: 37 MG/G CREAT (ref 0–29)
ALP SERPL-CCNC: 153 IU/L (ref 44–121)
ALT SERPL-CCNC: 20 IU/L (ref 0–44)
AST SERPL-CCNC: 32 IU/L (ref 0–40)
BILIRUB SERPL-MCNC: 0.5 MG/DL (ref 0–1.2)
BUN SERPL-MCNC: 25 MG/DL (ref 8–27)
BUN/CREAT SERPL: 21 (ref 10–24)
CALCIUM SERPL-MCNC: 8.9 MG/DL (ref 8.6–10.2)
CHLORIDE SERPL-SCNC: 97 MMOL/L (ref 96–106)
CO2 SERPL-SCNC: 29 MMOL/L (ref 20–29)
CREAT SERPL-MCNC: 1.18 MG/DL (ref 0.76–1.27)
CREAT UR-MCNC: 29.2 MG/DL
EGFRCR SERPLBLD CKD-EPI 2021: 65 ML/MIN/1.73
ERYTHROCYTE [DISTWIDTH] IN BLOOD BY AUTOMATED COUNT: 18.4 % (ref 11.6–15.4)
EST. AVERAGE GLUCOSE BLD GHB EST-MCNC: 163 MG/DL
GLOBULIN SER CALC-MCNC: 3.5 G/DL (ref 1.5–4.5)
GLUCOSE SERPL-MCNC: 45 MG/DL (ref 70–99)
HBA1C MFR BLD: 7.3 % (ref 4.8–5.6)
HCT VFR BLD AUTO: 50.7 % (ref 37.5–51)
HGB BLD-MCNC: 15.2 G/DL (ref 13–17.7)
MCH RBC QN AUTO: 28.6 PG (ref 26.6–33)
MCHC RBC AUTO-ENTMCNC: 30 G/DL (ref 31.5–35.7)
MCV RBC AUTO: 95 FL (ref 79–97)
MICROALBUMIN UR-MCNC: 10.8 UG/ML
PLATELET # BLD AUTO: 264 X10E3/UL (ref 150–450)
POTASSIUM SERPL-SCNC: 3.9 MMOL/L (ref 3.5–5.2)
PROT SERPL-MCNC: 7.2 G/DL (ref 6–8.5)
RBC # BLD AUTO: 5.32 X10E6/UL (ref 4.14–5.8)
SODIUM SERPL-SCNC: 142 MMOL/L (ref 134–144)
WBC # BLD AUTO: 7.9 X10E3/UL (ref 3.4–10.8)

## 2025-07-24 NOTE — TELEPHONE ENCOUNTER
Pt returned phone call and requesting that you call again. He stated that he is having issues with his phone and if you cannot contact him to leave a detailed voice message.

## 2025-07-25 NOTE — TELEPHONE ENCOUNTER
Spoke w patient about hypoglycemia, decreasing meal time insulin by 2 until seeing endocrinologist next week. All questions answered.    Joby Leigh MD, MPH  [facility]

## 2025-07-30 NOTE — TELEPHONE ENCOUNTER
Medication Refill Request    Deric Mabry is requesting a refill of the following medication(s):   Requested Prescriptions     Pending Prescriptions Disp Refills    potassium chloride (KLOR-CON) 10 MEQ extended release tablet 60 tablet 3     Sig: Take 1 tablet by mouth daily        Listed PCP is Jboy Leigh MD   Last provider to prescribe medication: Reese  Last Date of Medication Prescribed: 11/22/24   Date of Last Office Visit at [facility]: 7/23/25   FUTURE APPOINTMENT: No future appointments.    Please send refill to:    [facility] [id number] - [place], [place] - [address] [phone] - F [phone]  [address]  [facility] [address]  Phone: [phone] Fax: [phone]      Please review request and approve or deny with recommendations.

## 2025-07-31 RX ORDER — POTASSIUM CHLORIDE 750 MG/1
10 TABLET, EXTENDED RELEASE ORAL DAILY
Qty: 60 TABLET | Refills: 3 | Status: SHIPPED | OUTPATIENT
Start: 2025-07-31

## 2025-08-06 ENCOUNTER — HOSPITAL ENCOUNTER (OUTPATIENT)
Facility: HOSPITAL | Age: 75
Discharge: HOME | End: 2025-08-09
Payer: MEDICARE

## 2025-08-06 ENCOUNTER — TRANSCRIBE ORDERS (OUTPATIENT)
Facility: HOSPITAL | Age: 75
End: 2025-08-06

## 2025-08-06 DIAGNOSIS — M54.2 CERVICALGIA: ICD-10-CM

## 2025-08-06 DIAGNOSIS — M54.2 CERVICALGIA: Primary | ICD-10-CM

## 2025-08-06 PROCEDURE — 72050 X-RAY EXAM NECK SPINE 4/5VWS: CPT

## 2025-08-13 RX ORDER — LISINOPRIL 40 MG/1
40 TABLET ORAL DAILY
Qty: 90 TABLET | Refills: 2 | Status: SHIPPED | OUTPATIENT
Start: 2025-08-13

## (undated) DEVICE — CONNECTOR TBNG AUX H2O JET DISP FOR OLY 160/180 SER

## (undated) DEVICE — NEONATAL-ADULT SPO2 SENSOR: Brand: NELLCOR

## (undated) DEVICE — BW-412T DISP COMBO CLEANING BRUSH: Brand: SINGLE USE COMBINATION CLEANING BRUSH

## (undated) DEVICE — Device

## (undated) DEVICE — CANN NASAL O2 CAPNOGRAPHY AD -- FILTERLINE

## (undated) DEVICE — CATH IV AUTOGRD BC BLU 22GA 25 -- INSYTE

## (undated) DEVICE — BAG BELONG PT PERS CLEAR HANDL

## (undated) DEVICE — KENDALL RADIOLUCENT FOAM MONITORING ELECTRODE -RECTANGULAR SHAPE: Brand: KENDALL

## (undated) DEVICE — BAG SPEC BIOHZD LF 2MIL 6X10IN -- CONVERT TO ITEM 357326

## (undated) DEVICE — Z DISCONTINUED USE 2751540 TUBING IRRIG L10IN DISP PMP ENDOGATOR

## (undated) DEVICE — AIRLIFE™ U/CONNECT-IT OXYGEN TUBING 7 FEET (2.1 M) CRUSH-RESISTANT OXYGEN TUBING, VINYL TIPPED: Brand: AIRLIFE™

## (undated) DEVICE — CATHETER KIT JL 4 JL5 6 FRX100 CM 110 CM 145 PGTL DXTERITY

## (undated) DEVICE — HEART CATH-SFMC: Brand: MEDLINE INDUSTRIES, INC.

## (undated) DEVICE — REM POLYHESIVE ADULT PATIENT RETURN ELECTRODE: Brand: VALLEYLAB

## (undated) DEVICE — NEEDLE ANGIO 18GA L9CM NRML 1 WALL SMOOTH FINISH CLR HUB FOR

## (undated) DEVICE — TRAP SURG QUAD PARABOLA SLOT DSGN SFTY SCRN TRAPEASE

## (undated) DEVICE — SET EXTN TBNG L BOR 4 W STPCOCK ST 32IN PRIMING VOL 6ML

## (undated) DEVICE — SYR 50ML SLIP TIP NSAF LF STRL --

## (undated) DEVICE — SOLIDIFIER FLUID 3000 CC ABSORB

## (undated) DEVICE — KIT IV STRT W CHLORAPREP PD 1ML

## (undated) DEVICE — NEEDLE HYPO 18GA L1.5IN PNK S STL HUB POLYPR SHLD REG BVL

## (undated) DEVICE — 1200 GUARD II KIT W/5MM TUBE W/O VAC TUBE: Brand: GUARDIAN

## (undated) DEVICE — ENDO CARRY-ON PROCEDURE KIT INCLUDES ENZYMATIC SPONGE, GAUZE, BIOHAZARD LABEL, TRAY, LUBRICANT, DIRTY SCOPE LABEL, WATER LABEL, TRAY, DRAWSTRING PAD, AND DEFENDO 4-PIECE KIT.: Brand: ENDO CARRY-ON PROCEDURE KIT

## (undated) DEVICE — PINNACLE INTRODUCER SHEATH: Brand: PINNACLE

## (undated) DEVICE — CONTAINER SPEC 20 ML LID NEUT BUFF FORMALIN 10 % POLYPR STS

## (undated) DEVICE — QUILTED PREMIUM COMFORT UNDERPAD,EXTRA HEAVY: Brand: WINGS

## (undated) DEVICE — SYRINGE MED 20ML STD CLR PLAS LUERLOCK TIP N CTRL DISP

## (undated) DEVICE — SET ADMIN 16ML TBNG L100IN 2 Y INJ SITE IV PIGGY BK DISP

## (undated) DEVICE — ANGIO-SEAL VIP VASCULAR CLOSURE DEVICE: Brand: ANGIO-SEAL

## (undated) DEVICE — Z DISCONTINUED NO SUB IDED SET EXTN W/ 4 W STPCOCK M SPIN LOK 36IN

## (undated) DEVICE — Z CONVERTED USE 2274299 CUFF BLD PRESSURE LNG MED AD 25-35 CM ARM FLEXIPORT DISP

## (undated) DEVICE — GUIDEWIRE VASC L180CM DIA0.035IN 3MM PTFE J TIP EXCHG FIX

## (undated) DEVICE — NEEDLE SCLERO 23GA L4MM CATH L240CM CNTRST SHTH DIA1.8MM

## (undated) DEVICE — SNARE ENDOSCP XL W33MMXL240CM SHTH DIA2.4MM COLON STIFF